# Patient Record
Sex: FEMALE | Race: WHITE | NOT HISPANIC OR LATINO | Employment: UNEMPLOYED | URBAN - METROPOLITAN AREA
[De-identification: names, ages, dates, MRNs, and addresses within clinical notes are randomized per-mention and may not be internally consistent; named-entity substitution may affect disease eponyms.]

---

## 2023-02-01 ENCOUNTER — OFFICE VISIT (OUTPATIENT)
Dept: OBSTETRICS AND GYNECOLOGY | Facility: CLINIC | Age: 53
End: 2023-02-01
Payer: MEDICAID

## 2023-02-01 VITALS
BODY MASS INDEX: 33.49 KG/M2 | SYSTOLIC BLOOD PRESSURE: 140 MMHG | WEIGHT: 189 LBS | DIASTOLIC BLOOD PRESSURE: 81 MMHG | HEIGHT: 63 IN | HEART RATE: 56 BPM

## 2023-02-01 DIAGNOSIS — N95.2 ATROPHIC VAGINITIS: ICD-10-CM

## 2023-02-01 DIAGNOSIS — F31.9 BIPOLAR AFFECTIVE DISORDER, REMISSION STATUS UNSPECIFIED: ICD-10-CM

## 2023-02-01 DIAGNOSIS — N95.9 MENOPAUSAL DISORDER: ICD-10-CM

## 2023-02-01 DIAGNOSIS — Z00.00 PHYSICAL EXAM, ANNUAL: Primary | ICD-10-CM

## 2023-02-01 PROCEDURE — 3008F BODY MASS INDEX DOCD: CPT | Mod: CPTII,S$GLB,, | Performed by: OBSTETRICS & GYNECOLOGY

## 2023-02-01 PROCEDURE — 1159F PR MEDICATION LIST DOCUMENTED IN MEDICAL RECORD: ICD-10-PCS | Mod: CPTII,S$GLB,, | Performed by: OBSTETRICS & GYNECOLOGY

## 2023-02-01 PROCEDURE — 3008F PR BODY MASS INDEX (BMI) DOCUMENTED: ICD-10-PCS | Mod: CPTII,S$GLB,, | Performed by: OBSTETRICS & GYNECOLOGY

## 2023-02-01 PROCEDURE — 3079F PR MOST RECENT DIASTOLIC BLOOD PRESSURE 80-89 MM HG: ICD-10-PCS | Mod: CPTII,S$GLB,, | Performed by: OBSTETRICS & GYNECOLOGY

## 2023-02-01 PROCEDURE — 99386 PR PREVENTIVE VISIT,NEW,40-64: ICD-10-PCS | Mod: S$GLB,,, | Performed by: OBSTETRICS & GYNECOLOGY

## 2023-02-01 PROCEDURE — 3079F DIAST BP 80-89 MM HG: CPT | Mod: CPTII,S$GLB,, | Performed by: OBSTETRICS & GYNECOLOGY

## 2023-02-01 PROCEDURE — 1159F MED LIST DOCD IN RCRD: CPT | Mod: CPTII,S$GLB,, | Performed by: OBSTETRICS & GYNECOLOGY

## 2023-02-01 PROCEDURE — 99386 PREV VISIT NEW AGE 40-64: CPT | Mod: S$GLB,,, | Performed by: OBSTETRICS & GYNECOLOGY

## 2023-02-01 PROCEDURE — 3077F SYST BP >= 140 MM HG: CPT | Mod: CPTII,S$GLB,, | Performed by: OBSTETRICS & GYNECOLOGY

## 2023-02-01 PROCEDURE — 3077F PR MOST RECENT SYSTOLIC BLOOD PRESSURE >= 140 MM HG: ICD-10-PCS | Mod: CPTII,S$GLB,, | Performed by: OBSTETRICS & GYNECOLOGY

## 2023-02-01 RX ORDER — TIZANIDINE 2 MG/1
TABLET ORAL
COMMUNITY
Start: 2023-01-19 | End: 2023-06-12 | Stop reason: SDUPTHER

## 2023-02-01 RX ORDER — GABAPENTIN 800 MG/1
TABLET ORAL
COMMUNITY
Start: 2023-01-31 | End: 2023-05-16 | Stop reason: SDUPTHER

## 2023-02-01 RX ORDER — CARIPRAZINE 6 MG/1
CAPSULE, GELATIN COATED ORAL
COMMUNITY
Start: 2023-01-14 | End: 2023-05-16

## 2023-02-01 RX ORDER — ESTRADIOL 0.1 MG/D
1 PATCH TRANSDERMAL
Qty: 4 PATCH | Refills: 11 | Status: SHIPPED | OUTPATIENT
Start: 2023-02-01 | End: 2023-06-12

## 2023-02-01 RX ORDER — FAMOTIDINE 20 MG/1
20 TABLET, FILM COATED ORAL
COMMUNITY
Start: 2022-12-30 | End: 2023-05-16 | Stop reason: SDUPTHER

## 2023-02-01 RX ORDER — SUCRALFATE 1 G/1
TABLET ORAL
COMMUNITY
Start: 2022-12-19 | End: 2023-09-27 | Stop reason: SDUPTHER

## 2023-02-01 RX ORDER — ZOLPIDEM TARTRATE 10 MG/1
TABLET ORAL
COMMUNITY
Start: 2023-01-13

## 2023-02-01 RX ORDER — LORAZEPAM 1 MG/1
TABLET ORAL
COMMUNITY
Start: 2022-12-31

## 2023-02-01 RX ORDER — OLMESARTAN MEDOXOMIL AND HYDROCHLOROTHIAZIDE 20/12.5 20; 12.5 MG/1; MG/1
TABLET ORAL
COMMUNITY
Start: 2023-01-19 | End: 2023-05-16

## 2023-02-01 RX ORDER — HYDROXYZINE HYDROCHLORIDE 50 MG/1
TABLET, FILM COATED ORAL
COMMUNITY
Start: 2022-12-16 | End: 2023-05-16

## 2023-02-01 RX ORDER — ERGOCALCIFEROL 1.25 MG/1
50000 CAPSULE ORAL
COMMUNITY
Start: 2022-12-19 | End: 2023-05-16

## 2023-02-01 RX ORDER — VORTIOXETINE 20 MG/1
TABLET, FILM COATED ORAL
COMMUNITY
Start: 2022-12-31 | End: 2023-05-16

## 2023-02-01 NOTE — PROGRESS NOTES
Subjective:       Patient ID: Ana María Qureshi is a 52 y.o. female.    Chief Complaint: Well Woman and menopausal symptoms    52-year-old female here for annual exam she is currently be in evaluated by Psychiatry in changing her bipolar medication she also complains of decreased libido recently sexually active in dry vagina some hot flashes.  She is had a hysterectomy still has her ovaries.  I discussed hormones with her will put her on little patch if her libido does increase we can add some testosterone    Review of Systems   Constitutional:  Negative for activity change, appetite change, chills, fever and unexpected weight change.   HENT:  Negative for nasal congestion, dental problem, facial swelling, hearing loss and mouth sores.    Respiratory:  Negative for apnea, chest tightness, shortness of breath and wheezing.    Cardiovascular:  Negative for chest pain and leg swelling.   Gastrointestinal:  Negative for abdominal distention, abdominal pain, anal bleeding, blood in stool, constipation, diarrhea, nausea, rectal pain and vomiting.   Genitourinary:  Positive for dyspareunia. Negative for decreased urine volume, difficulty urinating, dysuria, enuresis, flank pain, frequency, genital sores, hematuria, menstrual problem, pelvic pain, urgency, vaginal bleeding, vaginal discharge and vaginal pain.   Musculoskeletal:  Negative for arthralgias, back pain, joint swelling, myalgias and neck pain.   Integumentary:  Negative for color change, pallor, rash and wound.   Allergic/Immunologic: Negative for immunocompromised state.   Neurological:  Negative for dizziness, light-headedness and headaches.   Hematological:  Negative for adenopathy. Does not bruise/bleed easily.   Psychiatric/Behavioral:  Negative for agitation, behavioral problems, confusion, sleep disturbance and suicidal ideas. The patient is not nervous/anxious and is not hyperactive.        Objective:      Physical Exam  Constitutional:       Appearance: She  is well-developed.   HENT:      Head: Normocephalic.      Nose: Nose normal.   Eyes:      Conjunctiva/sclera: Conjunctivae normal.      Pupils: Pupils are equal, round, and reactive to light.   Cardiovascular:      Rate and Rhythm: Normal rate and regular rhythm.      Heart sounds: Normal heart sounds.   Pulmonary:      Effort: Pulmonary effort is normal.      Breath sounds: Normal breath sounds.   Abdominal:      General: Bowel sounds are normal.      Palpations: Abdomen is soft.   Genitourinary:     Vagina: Normal.      Comments: Vulva vagina bimanual normal little bit atrophic vaginitis  Musculoskeletal:         General: Normal range of motion.      Cervical back: Normal range of motion and neck supple.   Skin:     General: Skin is warm and dry.   Neurological:      Mental Status: She is alert and oriented to person, place, and time.   Psychiatric:         Behavior: Behavior normal.         Thought Content: Thought content normal.     Breast implants without masses  Assessment:       Problem List Items Addressed This Visit    None      Plan:     Climara 0.1 mg

## 2023-02-07 LAB — Lab: NORMAL

## 2023-05-16 ENCOUNTER — OFFICE VISIT (OUTPATIENT)
Dept: FAMILY MEDICINE | Facility: CLINIC | Age: 53
End: 2023-05-16
Payer: MEDICAID

## 2023-05-16 VITALS
DIASTOLIC BLOOD PRESSURE: 98 MMHG | SYSTOLIC BLOOD PRESSURE: 180 MMHG | BODY MASS INDEX: 31.05 KG/M2 | HEART RATE: 76 BPM | TEMPERATURE: 98 F | WEIGHT: 175.25 LBS | HEIGHT: 63 IN | OXYGEN SATURATION: 96 %

## 2023-05-16 DIAGNOSIS — M77.32 HEEL SPUR, LEFT: ICD-10-CM

## 2023-05-16 DIAGNOSIS — I10 PRIMARY HYPERTENSION: ICD-10-CM

## 2023-05-16 DIAGNOSIS — F33.9 EPISODE OF RECURRENT MAJOR DEPRESSIVE DISORDER, UNSPECIFIED DEPRESSION EPISODE SEVERITY: ICD-10-CM

## 2023-05-16 DIAGNOSIS — E55.9 VITAMIN D DEFICIENCY: ICD-10-CM

## 2023-05-16 DIAGNOSIS — D50.9 IRON DEFICIENCY ANEMIA, UNSPECIFIED IRON DEFICIENCY ANEMIA TYPE: ICD-10-CM

## 2023-05-16 DIAGNOSIS — M79.7 FIBROMYALGIA: ICD-10-CM

## 2023-05-16 DIAGNOSIS — Z72.0 NICOTINE USE: ICD-10-CM

## 2023-05-16 DIAGNOSIS — G62.9 POLYNEUROPATHY: ICD-10-CM

## 2023-05-16 DIAGNOSIS — K25.7 CHRONIC GASTRIC ULCER WITHOUT HEMORRHAGE AND WITHOUT PERFORATION: ICD-10-CM

## 2023-05-16 DIAGNOSIS — M19.90 OSTEOARTHRITIS, UNSPECIFIED OSTEOARTHRITIS TYPE, UNSPECIFIED SITE: ICD-10-CM

## 2023-05-16 DIAGNOSIS — Z76.89 ENCOUNTER TO ESTABLISH CARE: Primary | ICD-10-CM

## 2023-05-16 DIAGNOSIS — Z12.31 SCREENING MAMMOGRAM FOR BREAST CANCER: ICD-10-CM

## 2023-05-16 DIAGNOSIS — F41.1 GENERALIZED ANXIETY DISORDER: ICD-10-CM

## 2023-05-16 DIAGNOSIS — G47.00 INSOMNIA, UNSPECIFIED TYPE: ICD-10-CM

## 2023-05-16 LAB
ALBUMIN SERPL-MCNC: 4.1 G/DL (ref 3.4–5)
ALBUMIN/GLOB SERPL: 1.6 RATIO
ALP SERPL-CCNC: 68 UNIT/L (ref 50–144)
ALT SERPL-CCNC: 19 UNIT/L (ref 1–45)
ANION GAP SERPL CALC-SCNC: 6 MEQ/L (ref 2–13)
AST SERPL-CCNC: 31 UNIT/L (ref 14–36)
BASOPHILS # BLD AUTO: 0.05 X10(3)/MCL (ref 0.01–0.08)
BASOPHILS NFR BLD AUTO: 0.6 % (ref 0.1–1.2)
BILIRUBIN DIRECT+TOT PNL SERPL-MCNC: 0.3 MG/DL (ref 0–1)
BUN SERPL-MCNC: 16 MG/DL (ref 7–20)
CALCIUM SERPL-MCNC: 9.1 MG/DL (ref 8.4–10.2)
CHLORIDE SERPL-SCNC: 106 MMOL/L (ref 98–110)
CHOLEST SERPL-MCNC: 174 MG/DL (ref 0–200)
CO2 SERPL-SCNC: 26 MMOL/L (ref 21–32)
CREAT SERPL-MCNC: 0.69 MG/DL (ref 0.66–1.25)
CREAT/UREA NIT SERPL: 23 (ref 12–20)
DEPRECATED CALCIDIOL+CALCIFEROL SERPL-MC: <20 NG/ML (ref 30–100)
EOSINOPHIL # BLD AUTO: 0.09 X10(3)/MCL (ref 0.04–0.36)
EOSINOPHIL NFR BLD AUTO: 1 % (ref 0.7–7)
ERYTHROCYTE [DISTWIDTH] IN BLOOD BY AUTOMATED COUNT: 15 % (ref 11–14.5)
EST. AVERAGE GLUCOSE BLD GHB EST-MCNC: 111.2 MG/DL (ref 70–115)
FERRITIN SERPL-MCNC: 8.54 NG/ML (ref 6.24–264)
GFR SERPLBLD CREATININE-BSD FMLA CKD-EPI: >90 MLS/MIN/1.73/M2
GLOBULIN SER-MCNC: 2.6 GM/DL (ref 2–3.9)
GLUCOSE SERPL-MCNC: 92 MG/DL (ref 70–115)
HBA1C MFR BLD: 5.5 % (ref 4–6)
HCT VFR BLD AUTO: 41.4 % (ref 36–48)
HDLC SERPL-MCNC: 62 MG/DL (ref 40–60)
HGB BLD-MCNC: 13.6 G/DL (ref 11.8–16)
IMM GRANULOCYTES # BLD AUTO: 0.02 X10(3)/MCL (ref 0–0.03)
IMM GRANULOCYTES NFR BLD AUTO: 0.2 % (ref 0–0.5)
LDLC SERPL DIRECT ASSAY-SCNC: 82.8 MG/DL (ref 30–100)
LYMPHOCYTES # BLD AUTO: 2.01 X10(3)/MCL (ref 1.16–3.74)
LYMPHOCYTES NFR BLD AUTO: 23.3 % (ref 20–55)
MCH RBC QN AUTO: 29.1 PG (ref 27–34)
MCHC RBC AUTO-ENTMCNC: 32.9 G/DL (ref 31–37)
MCV RBC AUTO: 88.7 FL (ref 79–99)
MONOCYTES # BLD AUTO: 0.55 X10(3)/MCL (ref 0.24–0.36)
MONOCYTES NFR BLD AUTO: 6.4 % (ref 4.7–12.5)
NEUTROPHILS # BLD AUTO: 5.91 X10(3)/MCL (ref 1.56–6.13)
NEUTROPHILS NFR BLD AUTO: 68.5 % (ref 37–73)
NRBC BLD AUTO-RTO: 0 %
PLATELET # BLD AUTO: 247 X10(3)/MCL (ref 140–371)
PMV BLD AUTO: 12 FL (ref 9.4–12.4)
POTASSIUM SERPL-SCNC: 4.7 MMOL/L (ref 3.5–5.1)
PROT SERPL-MCNC: 6.7 GM/DL (ref 6.3–8.2)
RBC # BLD AUTO: 4.67 X10(6)/MCL (ref 4–5.1)
SODIUM SERPL-SCNC: 138 MMOL/L (ref 135–145)
TRIGL SERPL-MCNC: 85 MG/DL (ref 30–200)
TSH SERPL-ACNC: 1.57 UIU/ML (ref 0.36–3.74)
WBC # SPEC AUTO: 8.63 X10(3)/MCL (ref 4–11.5)

## 2023-05-16 PROCEDURE — 3077F SYST BP >= 140 MM HG: CPT | Mod: CPTII,,, | Performed by: NURSE PRACTITIONER

## 2023-05-16 PROCEDURE — 84443 ASSAY THYROID STIM HORMONE: CPT | Performed by: NURSE PRACTITIONER

## 2023-05-16 PROCEDURE — 4010F PR ACE/ARB THEARPY RXD/TAKEN: ICD-10-PCS | Mod: CPTII,,, | Performed by: NURSE PRACTITIONER

## 2023-05-16 PROCEDURE — 3008F BODY MASS INDEX DOCD: CPT | Mod: CPTII,,, | Performed by: NURSE PRACTITIONER

## 2023-05-16 PROCEDURE — 3080F PR MOST RECENT DIASTOLIC BLOOD PRESSURE >= 90 MM HG: ICD-10-PCS | Mod: CPTII,,, | Performed by: NURSE PRACTITIONER

## 2023-05-16 PROCEDURE — 4010F ACE/ARB THERAPY RXD/TAKEN: CPT | Mod: CPTII,,, | Performed by: NURSE PRACTITIONER

## 2023-05-16 PROCEDURE — 3077F PR MOST RECENT SYSTOLIC BLOOD PRESSURE >= 140 MM HG: ICD-10-PCS | Mod: CPTII,,, | Performed by: NURSE PRACTITIONER

## 2023-05-16 PROCEDURE — 1159F MED LIST DOCD IN RCRD: CPT | Mod: CPTII,,, | Performed by: NURSE PRACTITIONER

## 2023-05-16 PROCEDURE — 1160F PR REVIEW ALL MEDS BY PRESCRIBER/CLIN PHARMACIST DOCUMENTED: ICD-10-PCS | Mod: CPTII,,, | Performed by: NURSE PRACTITIONER

## 2023-05-16 PROCEDURE — 1159F PR MEDICATION LIST DOCUMENTED IN MEDICAL RECORD: ICD-10-PCS | Mod: CPTII,,, | Performed by: NURSE PRACTITIONER

## 2023-05-16 PROCEDURE — 85025 COMPLETE CBC W/AUTO DIFF WBC: CPT | Performed by: NURSE PRACTITIONER

## 2023-05-16 PROCEDURE — 83550 IRON BINDING TEST: CPT | Performed by: NURSE PRACTITIONER

## 2023-05-16 PROCEDURE — 3008F PR BODY MASS INDEX (BMI) DOCUMENTED: ICD-10-PCS | Mod: CPTII,,, | Performed by: NURSE PRACTITIONER

## 2023-05-16 PROCEDURE — 83036 HEMOGLOBIN GLYCOSYLATED A1C: CPT | Performed by: NURSE PRACTITIONER

## 2023-05-16 PROCEDURE — 3080F DIAST BP >= 90 MM HG: CPT | Mod: CPTII,,, | Performed by: NURSE PRACTITIONER

## 2023-05-16 PROCEDURE — 80053 COMPREHEN METABOLIC PANEL: CPT | Performed by: NURSE PRACTITIONER

## 2023-05-16 PROCEDURE — 80061 LIPID PANEL: CPT | Performed by: NURSE PRACTITIONER

## 2023-05-16 PROCEDURE — 99204 OFFICE O/P NEW MOD 45 MIN: CPT | Mod: ,,, | Performed by: NURSE PRACTITIONER

## 2023-05-16 PROCEDURE — 82306 VITAMIN D 25 HYDROXY: CPT | Performed by: NURSE PRACTITIONER

## 2023-05-16 PROCEDURE — 82728 ASSAY OF FERRITIN: CPT | Performed by: NURSE PRACTITIONER

## 2023-05-16 PROCEDURE — 1160F RVW MEDS BY RX/DR IN RCRD: CPT | Mod: CPTII,,, | Performed by: NURSE PRACTITIONER

## 2023-05-16 PROCEDURE — 99204 PR OFFICE/OUTPT VISIT, NEW, LEVL IV, 45-59 MIN: ICD-10-PCS | Mod: ,,, | Performed by: NURSE PRACTITIONER

## 2023-05-16 RX ORDER — OLMESARTAN MEDOXOMIL 40 MG/1
TABLET ORAL
Qty: 30 TABLET | Refills: 0 | Status: SHIPPED | OUTPATIENT
Start: 2023-05-16 | End: 2023-06-20 | Stop reason: SDUPTHER

## 2023-05-16 RX ORDER — OLMESARTAN MEDOXOMIL 40 MG/1
40 TABLET ORAL DAILY
Qty: 30 TABLET | Refills: 0 | Status: SHIPPED | OUTPATIENT
Start: 2023-05-16 | End: 2023-05-16

## 2023-05-16 RX ORDER — FAMOTIDINE 20 MG/1
20 TABLET, FILM COATED ORAL 2 TIMES DAILY
Qty: 60 TABLET | Refills: 0 | Status: SHIPPED | OUTPATIENT
Start: 2023-05-16 | End: 2023-06-20 | Stop reason: SDUPTHER

## 2023-05-16 RX ORDER — NAPROXEN 500 MG/1
500 TABLET ORAL 2 TIMES DAILY
COMMUNITY
Start: 2023-04-13 | End: 2023-06-20

## 2023-05-16 RX ORDER — GABAPENTIN 800 MG/1
800 TABLET ORAL 3 TIMES DAILY
Qty: 90 TABLET | Refills: 0 | Status: SHIPPED | OUTPATIENT
Start: 2023-05-16 | End: 2023-06-13 | Stop reason: SDUPTHER

## 2023-05-16 RX ORDER — LISINOPRIL 30 MG/1
30 TABLET ORAL DAILY
COMMUNITY
Start: 2023-05-10 | End: 2023-05-16

## 2023-05-16 NOTE — PROGRESS NOTES
Patient ID: Ana María Qureshi is a 52 y.o. female.    Chief Complaint: Establish Care, Hypertension, and Anemia                     History of Present Illness:  The patient is 52 y.o. White female for evaluation and management with a chief complaint of Establish Care, Hypertension, and Anemia .  Previous PCP at had [facility] clinic but has not been seen in the past 4 months.  She has been visiting urgent care in [place] for medication refills.  She was diagnosed with hypertension 20-30 years ago but blood pressures have been very elevated over the past year.  She reports readings of 180/100 on lisinopril 30 mg daily.    She also reports questionable history of bipolar disorder.  Diagnosed with both anxiety and depression several years ago while experiencing a divorce.  She experienced drastic mood swings, constant worry, and agitation.  At this time, she is off of all of her psychiatric medications except Ambien and lorazepam.  He no longer feels that she requires the Trintellix or Vraylar.  She continues to complain of feeling stressed and anxious but is resting well with the Ambien.  No SI/HI.  Established with Dr. Frost in [place] for Psychiatry.      Requesting refill on gabapentin.  She complains of generalized pain secondary to fibromyalgia and burning/tingling pain to bilateral lower legs and feet secondary to neuropathy.  She attributes much of this pain to a fall experienced 7-8 years ago resulting in disc herniation of cervical and lumbar regions.    History of peptic ulcer disease with most recent EGD within the past 1-2 years by Dr. Mckeon in [place].  Ulcerations near site of gastric bypass.   Requesting refill on famotidine.  Uses sulcralfate as needed.  GERD was best controlled on Dexilant in the past but was unable to afford. History of colon polyps that were benign.  She also has a history of iron-deficiency anemia that required 3 units of packed red blood cells within the past few years.  She  reports that they were unable to determine site of blood loss.     History of alcohol abuse but has been sober for the past 5 years.  Currently smokes 1 pack of cigarettes per day.  Intermittent smoker for the past 40 years.      Review of Systems   Constitutional:  Negative for activity change, appetite change, diaphoresis and fever.   HENT:  Negative for ear pain, hearing loss and trouble swallowing.    Eyes:  Negative for pain and visual disturbance.   Respiratory:  Negative for cough, chest tightness and shortness of breath.    Cardiovascular:  Negative for chest pain, palpitations, leg swelling and claudication.   Gastrointestinal:  Negative for abdominal pain, blood in stool, change in bowel habit, constipation, diarrhea, nausea, vomiting and change in bowel habit.   Endocrine: Negative for cold intolerance, heat intolerance, polydipsia, polyphagia and polyuria.   Genitourinary:  Negative for dysuria, frequency and hematuria.   Musculoskeletal:  Positive for arthralgias, back pain, leg pain and neck pain. Negative for gait problem and joint swelling.   Integumentary:  Negative for rash and mole/lesion.   Allergic/Immunologic: Negative for environmental allergies and food allergies.   Neurological:  Negative for dizziness, seizures, weakness, headaches and memory loss.   Psychiatric/Behavioral:  Positive for agitation and sleep disturbance. Negative for confusion, hallucinations and suicidal ideas. The patient is nervous/anxious.        Past Medical History:  has a past medical history of Anemia, Hypertension, and Mental disorder.    Current Outpatient Medications   Medication Instructions    estradiol 0.1 mg/24 hr td ptwk (ESTRADIOL TRANSDERMAL PATCH) 0.1 mg/24 hr PTWK 1 patch, Transdermal, Every 7 days    famotidine (PEPCID) 20 mg, Oral    gabapentin (NEURONTIN) 800 mg, Oral, 3 times daily    LORazepam (ATIVAN) 1 MG tablet No dose, route, or frequency recorded.    naproxen (NAPROSYN) 500 mg, Oral, 2 times  "daily    olmesartan (BENICAR) 40 MG tablet TAKE 1 TABLET BY MOUTH ONCE DAILY    sucralfate (CARAFATE) 1 gram tablet TAKE 1 TABLET BY MOUTH FOUR TIMES A DAY ONE HOUR BEFORE MEALS    tiZANidine (ZANAFLEX) 2 MG tablet No dose, route, or frequency recorded.    zolpidem (AMBIEN) 10 mg Tab No dose, route, or frequency recorded.       Patient has No Known Allergies.       Visit Vitals  BP (!) 180/98   Pulse 76   Temp 98.2 °F (36.8 °C) (Temporal)   Ht 5' 3" (1.6 m)   Wt 79.5 kg (175 lb 4.3 oz)   SpO2 96%   BMI 31.05 kg/m²         Physical Exam  Constitutional:       General: She is not in acute distress.     Appearance: Normal appearance. She is obese.   HENT:      Right Ear: Tympanic membrane, ear canal and external ear normal.      Left Ear: Tympanic membrane, ear canal and external ear normal.      Mouth/Throat:      Mouth: Mucous membranes are moist.      Pharynx: Oropharynx is clear.   Eyes:      General: No scleral icterus.     Extraocular Movements: Extraocular movements intact.      Conjunctiva/sclera: Conjunctivae normal.      Pupils: Pupils are equal, round, and reactive to light.   Cardiovascular:      Rate and Rhythm: Normal rate and regular rhythm.      Pulses: Normal pulses.      Heart sounds: Normal heart sounds.   Pulmonary:      Effort: Pulmonary effort is normal.      Breath sounds: Normal breath sounds.   Abdominal:      General: Abdomen is flat. Bowel sounds are normal.      Palpations: Abdomen is soft.      Tenderness: There is abdominal tenderness (Generalized).   Musculoskeletal:         General: Normal range of motion.      Cervical back: Normal range of motion and neck supple. No tenderness.      Right lower leg: No edema.      Left lower leg: No edema.   Lymphadenopathy:      Cervical: No cervical adenopathy.   Skin:     General: Skin is warm and dry.   Neurological:      Mental Status: She is alert and oriented to person, place, and time. Mental status is at baseline.   Psychiatric:         Mood " and Affect: Mood normal.         Thought Content: Thought content normal.         Judgment: Judgment normal.           Assessment/Plan:    ICD-10-CM ICD-9-CM   1. Encounter to establish care  Z76.89 V65.8   2. Primary hypertension  I10 401.9   3. Chronic gastric ulcer without hemorrhage and without perforation  K25.7 531.70   4. Iron deficiency anemia, unspecified iron deficiency anemia type  D50.9 280.9   5. Polyneuropathy  G62.9 356.9   6. Osteoarthritis, unspecified osteoarthritis type, unspecified site  M19.90 715.90   7. Screening mammogram for breast cancer  Z12.31 V76.12   8. Vitamin D deficiency  E55.9 268.9   9. Fibromyalgia  M79.7 729.1   10. Episode of recurrent major depressive disorder, unspecified depression episode severity  F33.9 296.30   11. Generalized anxiety disorder  F41.1 300.02   12. Insomnia, unspecified type  G47.00 780.52   13. Nicotine use  Z72.0 305.1   14. Heel spur, left  M77.32 726.73       1. Encounter to establish care  Obtain baseline labs today including CBC, CMP, TSH, FLP, A1c    2. Primary hypertension  Elevated in office   Stop lisinopril and restart olmesartan 40 mg daily   Instructed to monitor blood pressure daily, record, and bring log to next visit   Low-sodium diet    3. Chronic gastric ulcer without hemorrhage and without perforation  Continue famotidine 20 mg b.i.d.  Obtain records from most recent EGD    4. Iron deficiency anemia, unspecified iron deficiency anemia type  CBC with iron studies today    5. Polyneuropathy  - gabapentin (NEURONTIN) 800 MG tablet; Take 1 tablet (800 mg total) by mouth 3 (three) times daily.  Dispense: 90 tablet; Refill: 0    6. Osteoarthritis, unspecified osteoarthritis type, unspecified site  Continue naproxen as needed     7. Screening mammogram for breast cancer  Mammo Digital Screening Bilat w/ Erick; Future    8. Vitamin D deficiency  Obtain vitamin-D level today    9. Fibromyalgia  Continue gabapentin 800 mg t.i.d.  Discussed duloxetine  but patient reports that it was ineffective in the past    10. Episode of recurrent major depressive disorder, unspecified depression episode severity  Off of all medications   Encouraged to discuss current issues with Psychiatry  Offered appointment with psychiatric nurse practitioner in office but patient declines at this time    11. Generalized anxiety disorder  Practice deep breathing when anxiety occurs.  Exercise daily. Get sunlight daily.  Avoid caffeine, alcohol and stimulants.  Practice positive phrases and repeat throughout the day, along with yoga and relaxation techniques.  Set healthy boundaries, avoid people and conversations that increase stress.  Reports any suicidal or homicidal ideations immediately. If clinic is closed, go to nearest emergency room.     12. Insomnia, unspecified type  Continue follow-up with Psychiatry   Continue Ambien 10 mg as needed    13. Nicotine use  Encourage complete cessation    14. Heel spur, left       Return to clinic in 2 weeks with blood pressure log or sooner if needed    Future Appointments   Date Time Provider Department Center   6/12/2023  8:00 AM SANDOR Roblero [facility]   2/6/2024 10:30 AM Rafael Freitas MD [id number]  SANDOR Mckenzie

## 2023-05-17 LAB
IRON SATN MFR SERPL: 27 % (ref 20–50)
IRON SERPL-MCNC: 84 UG/DL (ref 50–170)
TIBC SERPL-MCNC: 231 UG/DL (ref 70–310)
TIBC SERPL-MCNC: 315 UG/DL (ref 250–450)
TRANSFERRIN SERPL-MCNC: 285 MG/DL (ref 180–382)

## 2023-06-08 ENCOUNTER — TELEPHONE (OUTPATIENT)
Dept: FAMILY MEDICINE | Facility: CLINIC | Age: 53
End: 2023-06-08
Payer: MEDICAID

## 2023-06-08 NOTE — TELEPHONE ENCOUNTER
Pt states she needs nasal spray but does not know what it is.  Advised her to bring meds to all visits so we can document everything, pt verbalized understanding

## 2023-06-08 NOTE — TELEPHONE ENCOUNTER
----- Message from Sho Newby sent at 6/8/2023  3:23 PM CDT -----  Regarding: CALL BACK  Pt called said last time she was seen in the office on 5/16/23, she had currently been on a nasal spray, and she was told that she would get refills on it, she said she got refills on all her meds except for that one?    Pat       [phone]

## 2023-06-12 ENCOUNTER — OFFICE VISIT (OUTPATIENT)
Dept: FAMILY MEDICINE | Facility: CLINIC | Age: 53
End: 2023-06-12
Payer: MEDICAID

## 2023-06-12 VITALS
DIASTOLIC BLOOD PRESSURE: 90 MMHG | OXYGEN SATURATION: 95 % | HEART RATE: 74 BPM | BODY MASS INDEX: 30.28 KG/M2 | SYSTOLIC BLOOD PRESSURE: 144 MMHG | HEIGHT: 63 IN | TEMPERATURE: 98 F | WEIGHT: 170.88 LBS

## 2023-06-12 DIAGNOSIS — Z72.0 NICOTINE USE: ICD-10-CM

## 2023-06-12 DIAGNOSIS — Z12.2 SCREENING FOR LUNG CANCER: ICD-10-CM

## 2023-06-12 DIAGNOSIS — E55.9 VITAMIN D DEFICIENCY: ICD-10-CM

## 2023-06-12 DIAGNOSIS — I10 PRIMARY HYPERTENSION: Primary | ICD-10-CM

## 2023-06-12 DIAGNOSIS — N90.89 LESION OF LABIA: ICD-10-CM

## 2023-06-12 DIAGNOSIS — Z12.31 SCREENING MAMMOGRAM FOR BREAST CANCER: ICD-10-CM

## 2023-06-12 DIAGNOSIS — S39.011A STRAIN OF MUSCLE OF RIGHT GROIN REGION: ICD-10-CM

## 2023-06-12 PROCEDURE — 1160F PR REVIEW ALL MEDS BY PRESCRIBER/CLIN PHARMACIST DOCUMENTED: ICD-10-PCS | Mod: CPTII,,, | Performed by: NURSE PRACTITIONER

## 2023-06-12 PROCEDURE — 3008F PR BODY MASS INDEX (BMI) DOCUMENTED: ICD-10-PCS | Mod: CPTII,,, | Performed by: NURSE PRACTITIONER

## 2023-06-12 PROCEDURE — 1159F MED LIST DOCD IN RCRD: CPT | Mod: CPTII,,, | Performed by: NURSE PRACTITIONER

## 2023-06-12 PROCEDURE — 3077F PR MOST RECENT SYSTOLIC BLOOD PRESSURE >= 140 MM HG: ICD-10-PCS | Mod: CPTII,,, | Performed by: NURSE PRACTITIONER

## 2023-06-12 PROCEDURE — 3080F PR MOST RECENT DIASTOLIC BLOOD PRESSURE >= 90 MM HG: ICD-10-PCS | Mod: CPTII,,, | Performed by: NURSE PRACTITIONER

## 2023-06-12 PROCEDURE — 99214 OFFICE O/P EST MOD 30 MIN: CPT | Mod: ,,, | Performed by: NURSE PRACTITIONER

## 2023-06-12 PROCEDURE — 1160F RVW MEDS BY RX/DR IN RCRD: CPT | Mod: CPTII,,, | Performed by: NURSE PRACTITIONER

## 2023-06-12 PROCEDURE — 1159F PR MEDICATION LIST DOCUMENTED IN MEDICAL RECORD: ICD-10-PCS | Mod: CPTII,,, | Performed by: NURSE PRACTITIONER

## 2023-06-12 PROCEDURE — 3080F DIAST BP >= 90 MM HG: CPT | Mod: CPTII,,, | Performed by: NURSE PRACTITIONER

## 2023-06-12 PROCEDURE — 4010F PR ACE/ARB THEARPY RXD/TAKEN: ICD-10-PCS | Mod: CPTII,,, | Performed by: NURSE PRACTITIONER

## 2023-06-12 PROCEDURE — 4010F ACE/ARB THERAPY RXD/TAKEN: CPT | Mod: CPTII,,, | Performed by: NURSE PRACTITIONER

## 2023-06-12 PROCEDURE — 3077F SYST BP >= 140 MM HG: CPT | Mod: CPTII,,, | Performed by: NURSE PRACTITIONER

## 2023-06-12 PROCEDURE — 99214 PR OFFICE/OUTPT VISIT, EST, LEVL IV, 30-39 MIN: ICD-10-PCS | Mod: ,,, | Performed by: NURSE PRACTITIONER

## 2023-06-12 PROCEDURE — 3008F BODY MASS INDEX DOCD: CPT | Mod: CPTII,,, | Performed by: NURSE PRACTITIONER

## 2023-06-12 RX ORDER — CHLORTHALIDONE 25 MG/1
25 TABLET ORAL DAILY
Qty: 30 TABLET | Refills: 0 | Status: SHIPPED | OUTPATIENT
Start: 2023-06-12 | End: 2023-06-27 | Stop reason: SDUPTHER

## 2023-06-12 RX ORDER — TIZANIDINE 4 MG/1
4 TABLET ORAL EVERY 8 HOURS PRN
Qty: 30 TABLET | Refills: 0 | Status: SHIPPED | OUTPATIENT
Start: 2023-06-12 | End: 2023-06-20 | Stop reason: SDUPTHER

## 2023-06-12 RX ORDER — DICLOFENAC SODIUM 10 MG/G
2 GEL TOPICAL 4 TIMES DAILY PRN
Qty: 50 G | Refills: 0 | Status: SHIPPED | OUTPATIENT
Start: 2023-06-12 | End: 2023-06-20

## 2023-06-12 NOTE — PROGRESS NOTES
Patient ID: Ana María Qureshi is a 52 y.o. female.    Chief Complaint: Hypertension (Follow up)                     History of Present Illness:  The patient is 52 y.o. White female for evaluation and management with a chief complaint of Hypertension (Follow up) .  Home blood pressure readings remain elevated 150 to 170/100.  She complains of occasional headache and fatigue.    Complains of right upper, inner thigh pain.  Onset 1-2 weeks ago.  Pain is worse with position changes or if standing for a prolonged duration.  Some improvement with diclofenac but causes epigastric discomfort.  She has a history of gastric ulcers with bleeding and gastric bypass in 2003.    She complains of a small bump near vagina.  First noticed about 3-4 weeks ago after shaving of the area.  Slightly tender to the touch.  Inserted a needle with small amount of bloody drainage.  Recently established care with gyn in [place] but lacks transportation for visit at this time.  Would like local gyn.    She smokes 1 pack of cigarettes per day.  Daily smoker for 30 years.    ROS: See HPI    Past Medical History:  has a past medical history of Anemia, Hypertension, and Mental disorder.    Current Outpatient Medications   Medication Instructions    chlorthalidone (HYGROTEN) 25 mg, Oral, Daily    diclofenac sodium (VOLTAREN) 2 g, Topical (Top), 4 times daily PRN    famotidine (PEPCID) 20 mg, Oral, 2 times daily    gabapentin (NEURONTIN) 800 mg, Oral, 3 times daily    LORazepam (ATIVAN) 1 MG tablet No dose, route, or frequency recorded.    naproxen (NAPROSYN) 500 mg, Oral, 2 times daily    olmesartan (BENICAR) 40 MG tablet TAKE 1 TABLET BY MOUTH ONCE DAILY    sucralfate (CARAFATE) 1 gram tablet TAKE 1 TABLET BY MOUTH FOUR TIMES A DAY ONE HOUR BEFORE MEALS    tiZANidine (ZANAFLEX) 4 mg, Oral, Every 8 hours PRN    zolpidem (AMBIEN) 10 mg Tab No dose, route, or frequency recorded.       Patient has No Known Allergies.       Visit Vitals  BP (!) 144/90  "  Pulse 74   Temp 97.7 °F (36.5 °C) (Oral)   Ht 5' 3" (1.6 m)   Wt 77.5 kg (170 lb 13.7 oz)   SpO2 95%   BMI 30.27 kg/m²         Physical Exam  Vitals reviewed.   Constitutional:       Appearance: Normal appearance.   Cardiovascular:      Rate and Rhythm: Normal rate and regular rhythm.      Heart sounds: Normal heart sounds.   Pulmonary:      Effort: Pulmonary effort is normal.      Breath sounds: Normal breath sounds.   Abdominal:      General: Bowel sounds are normal.      Palpations: Abdomen is soft.   Genitourinary:     Comments: 0.4 cm x 0.4 cm pink, mildly tender nodule to left labia  Musculoskeletal:      Cervical back: Normal range of motion and neck supple. No tenderness.      Comments: Tenderness to medial aspect of right upper leg.  Full active range of motion with pain   Lymphadenopathy:      Cervical: No cervical adenopathy.   Skin:     General: Skin is warm and dry.   Neurological:      Mental Status: She is alert.       Recent Labs Obtained:  No visits with results within 7 Day(s) from this visit.   Latest known visit with results is:   Office Visit on 05/16/2023   Component Date Value Ref Range Status    Sodium Level 05/16/2023 138  135 - 145 mmol/L Final    Potassium Level 05/16/2023 4.7  3.5 - 5.1 mmol/L Final    Chloride 05/16/2023 106  98 - 110 mmol/L Final    Carbon Dioxide 05/16/2023 26  21 - 32 mmol/L Final    Glucose Level 05/16/2023 92  70 - 115 mg/dL Final    Blood Urea Nitrogen 05/16/2023 16.0  7.0 - 20.0 mg/dL Final    Creatinine 05/16/2023 0.69  0.66 - 1.25 mg/dL Final    Calcium Level Total 05/16/2023 9.1  8.4 - 10.2 mg/dL Final    Protein Total 05/16/2023 6.7  6.3 - 8.2 gm/dL Final    Albumin Level 05/16/2023 4.1  3.4 - 5.0 g/dL Final    Globulin 05/16/2023 2.6  2.0 - 3.9 gm/dL Final    Albumin/Globulin Ratio 05/16/2023 1.6  ratio Final    Bilirubin Total 05/16/2023 0.3  0.0 - 1.0 mg/dL Final    Alkaline Phosphatase 05/16/2023 68  50 - 144 unit/L Final    Alanine Aminotransferase " 05/16/2023 19  1 - 45 unit/L Final    Aspartate Aminotransferase 05/16/2023 31  14 - 36 unit/L Final    eGFR 05/16/2023 >90  mls/min/1.73/m2 Final                         EGFR INTERPRETATION    Beginning 8/15/22 we are reporting the eGFRcr calculation as recommended by the [employer]. The eGFRcr equation has similar overall performance characteristics to the older equation, but the values may differ by more than 10% particularly at higher values of eGFRcr and younger adult ages.    NKF stages of chronic kidney disease (CKD)  Stage 1: Kidney damage with normal or increased eGFR (>90 mL/min/1.73 m^2)  Stage 2: Mild reduction in GFR (60-89 mL/min/1.73 m^2)  Stage 3a: Moderate reduction in GFR (45-59 mL/min/1.73 m^2)  Stage 3b: Moderate reduction in GFR (30-44 mL/min/1.73 m^2)  Stage 4: Severe reduction in GFR (15-29 mL/min/1.73 m^2)  Stage 5: Kidney failure (GFR <15 mL/min/1.73 m^2)        Anion Gap 05/16/2023 6.0  2.0 - 13.0 mEq/L Final    BUN/Creatinine Ratio 05/16/2023 23 (H)  12 - 20 Final    Cholesterol Total 05/16/2023 174  0 - 200 mg/dL Final    HDL Cholesterol 05/16/2023 62 (H)  40 - 60 mg/dL Final    Triglyceride 05/16/2023 85  30 - 200 mg/dL Final    LDL Cholesterol Direct 05/16/2023 82.8  30.0 - 100.0 mg/dL Final    Hemoglobin A1c 05/16/2023 5.5  4.0 - 6.0 % Final    Estimated Average Glucose 05/16/2023 111.2  70.0 - 115.0 mg/dL Final    Thyroid Stimulating Hormone 05/16/2023 1.570  0.360 - 3.740 uIU/mL Final    Iron Binding Capacity Unsaturated 05/16/2023 231  70 - 310 ug/dL Final    Iron Level 05/16/2023 84  50 - 170 ug/dL Final    Transferrin 05/16/2023 285  180 - 382 mg/dL Final    Iron Binding Capacity Total 05/16/2023 315  250 - 450 ug/dL Final    Iron Saturation 05/16/2023 27  20 - 50 % Final    Ferritin Level 05/16/2023 8.54  6.24 - 264.00 ng/mL Final    Vit D 25 OH 05/16/2023 <20.0 (L)  30.0 - 100.0 ng/mL Final    WBC 05/16/2023 8.63  4.00 - 11.50 x10(3)/mcL Final    RBC 05/16/2023  4.67  4.00 - 5.10 x10(6)/mcL Final    Hgb 05/16/2023 13.6  11.8 - 16.0 g/dL Final    Hct 05/16/2023 41.4  36.0 - 48.0 % Final    MCV 05/16/2023 88.7  79.0 - 99.0 fL Final    MCH 05/16/2023 29.1  27.0 - 34.0 pg Final    MCHC 05/16/2023 32.9  31.0 - 37.0 g/dL Final    RDW 05/16/2023 15.0 (H)  11.0 - 14.5 % Final    Platelet 05/16/2023 247  140 - 371 x10(3)/mcL Final    MPV 05/16/2023 12.0  9.4 - 12.4 fL Final    Neut % 05/16/2023 68.5  37 - 73 % Final    Lymph % 05/16/2023 23.3  20 - 55 % Final    Mono % 05/16/2023 6.4  4.7 - 12.5 % Final    Eos % 05/16/2023 1.0  0.7 - 7 % Final    Basophil % 05/16/2023 0.6  0.1 - 1.2 % Final    Lymph # 05/16/2023 2.01  1.16 - 3.74 x10(3)/mcL Final    Neut # 05/16/2023 5.91  1.56 - 6.13 x10(3)/mcL Final    Mono # 05/16/2023 0.55 (H)  0.24 - 0.36 x10(3)/mcL Final    Eos # 05/16/2023 0.09  0.04 - 0.36 x10(3)/mcL Final    Baso # 05/16/2023 0.05  0.01 - 0.08 x10(3)/mcL Final    IG# 05/16/2023 0.02  0.0001 - 0.031 x10(3)/mcL Final    IG% 05/16/2023 0.2  0 - 0.5 % Final    NRBC% 05/16/2023 0.0  <=1 % Final         Assessment/Plan:    ICD-10-CM ICD-9-CM   1. Primary hypertension  I10 401.9   2. Vitamin D deficiency  E55.9 268.9   3. Strain of muscle of right groin region  S39.011A 848.8   4. Lesion of labia  N90.89 624.8   5. Nicotine use  Z72.0 305.1   6. Screening for lung cancer  Z12.2 V76.0   7. Screening mammogram for breast cancer  Z12.31 V76.12       1. Primary hypertension  Remains elevated today   Continue olmesartan 40 mg daily and add chlorthalidone 25 mg daily   Low-sodium diet  - chlorthalidone (HYGROTEN) 25 MG Tab; Take 1 tablet (25 mg total) by mouth once daily.  Dispense: 30 tablet; Refill: 0    2. Vitamin D deficiency  Vitamin-D less than 20  Restart Vitamin D 50,000 IU weekly    3. Strain of muscle of right groin region  Hot compresses, stretching  Stop oral NSAIDs and begin diclofenac gel as needed   Increase tizanidine to 4 mg PRN TID  - tiZANidine (ZANAFLEX) 4 MG tablet;  Take 1 tablet (4 mg total) by mouth every 8 (eight) hours as needed (pain).  Dispense: 30 tablet; Refill: 0  - diclofenac sodium (VOLTAREN) 1 % Gel; Apply 2 g topically 4 (four) times daily as needed (pain).  Dispense: 50 g; Refill: 0    4. Lesion of labia  Warm compresses  Avoid shaving until resolution  - Ambulatory referral/consult to Obstetrics / Gynecology; Future    5. Nicotine use  Advise complete cessation  - CT Chest Low Dose Diagnostic; Future    6. Screening for lung cancer  - CT Chest Low Dose Diagnostic; Future    7. Screening mammogram for breast cancer  - Mammo Digital Screening Bilat w/ Erick; Future         Follow up in about 1 week (around 6/19/2023) for BP . or sooner as needed.    Future Appointments   Date Time Provider Department Center   2/6/2024 10:30 AM Rafael Freitas MD [id number] Mckenzie

## 2023-06-13 DIAGNOSIS — G62.9 POLYNEUROPATHY: ICD-10-CM

## 2023-06-13 RX ORDER — GABAPENTIN 800 MG/1
800 TABLET ORAL 3 TIMES DAILY
Qty: 90 TABLET | Refills: 0 | Status: SHIPPED | OUTPATIENT
Start: 2023-06-13 | End: 2023-06-27 | Stop reason: SDUPTHER

## 2023-06-13 NOTE — TELEPHONE ENCOUNTER
"Pt telephoned regarding gabapentin refill. She claimed it was not filled on yesterday during her appt but I did ask her if she requested the refill. She stated "Well I gave the nurse all of my bottles and she saw that it was empty." I did inform her geovanny thought medication bottles may present to clinic empty, the patient is responsible to request a refill of needed meds. She verbalized understanding.      She would like the med sent to the pharmacy listed. I went ahead and proposed it to you just in case you would like to take care of it. She was informed you are out of office until Thursday.  "

## 2023-06-14 ENCOUNTER — PATIENT OUTREACH (OUTPATIENT)
Dept: ADMINISTRATIVE | Facility: HOSPITAL | Age: 53
End: 2023-06-14
Payer: MEDICAID

## 2023-06-14 NOTE — PROGRESS NOTES
Population Health Chart Review & Patient Outreach Details:     Reason for Outreach Encounter:     [x]  Non-Compliant Report   []  Payor Report ([employer], PHN, BCBS, MSSP, MCIP, C, etc.)   []  Pre-Visit Chart Review     Updates Requested / Reviewed:     []  Care Everywhere    []     [x]  External Sources (Requested external records)   [x]  Care Team Updated    Patient Outreach Method:    []  Telephone Outreach Completed   [] Successful   [] Left Voicemail   [] Unable to Contact (wrong number, no voicemail)  []  [facility] Outreach Sent  []  Letter Outreach Mailed  [x]  Fax Sent for External Records  []  External Records Upload    Health Maintenance Topics Addressed and Outreach Outcomes / Actions Taken:        [x]      Breast Cancer Screening [x]  Mammogram Ordered 6/12/23     []  External Records Requested     []  Added Reminder to Complete to Upcoming Primary Care Appt Notes     []  Patient Declined     []  Patient Will Call Back to Schedule     []  Patient Will Schedule with External Provider / Order Routed if Applicable             []       Cervical Cancer Screening []  Pap Scheduled      []  External Records Requested     []  Added Reminder to Complete to Upcoming Primary Care Appt Notes     []  Patient Declined     []  Patient Will Call Back to Schedule                     [x]          Colorectal Cancer Screening []  Colonoscopy Case Request or Referral Placed     [x]  External Records Requested     []  Added Reminder to Complete to Upcoming Primary Care Appt Notes     []  Patient Declined     []  Patient Will Call Back to Schedule     []  Patient Will Schedule with External Provider     []  Fit Kit Mailed (add the SmartPhrase under additional notes)     []  Reminded Patient to Complete Home Test             []      Diabetic Eye Exam []  Eye Camera Scheduled or Optometry Referral Placed     []  External Records Requested     []  Added Reminder to Complete to Upcoming Primary Care Appt Notes  Returned patient's call.     Informed patient of Dr. Shore's note. Patient verbalized understanding.       []  Patient Declined     []  Patient Will Call Back to Schedule     []  Patient Will Schedule with External Provider             []      Blood Pressure Control []  Primary Care Follow Up Visit Scheduled     []  Remote Blood Pressure Reading Captured     []  Added Reminder to Complete to Upcoming Primary Care Appt Notes     []  Patient Declined     []  Patient Will Call Back / Patient Will Send Portal Message with Reading     []  Patient Will Call Back to Schedule Provider Visit             []       HbA1c & Other Labs []  Lab Appt Scheduled for Due Labs     []  Primary Care Follow Up Visit Scheduled      []  Reminded Patient to Complete Home Test     []  Added Reminder to Complete to Upcoming Primary Care Appt Notes     []  Patient Declined     []  Patient Will Call Back to Schedule     []  Patient Will Schedule with External Provider / Order Routed if Applicable           []    Schedule Primary Care Appt []  Primary Care Appt Scheduled     []  Patient Declined     []  Patient Will Call Back to Schedule     []  Pt Established with External Provider & Updated Care Team             []      Medication Adherence []  Primary Care Appointment Scheduled     []  Added Reminder to Upcoming Primary Care Appt Notes     []  Patient Reminded to  Prescription     []  Patient Declined, Provider Notified if Needed     []  Sent Provider Message to Review and/or Add Exclusion to Problem List             []      Osteoporosis Screening []  DXA Appointment Scheduled     []  External Records Requested     []  Added Reminder to Complete to Upcoming Primary Care Appt Notes     []  Patient Declined     []  Patient Will Call Back to Schedule     []  Patient Will Schedule with External Provider / Order Routed if Applicable     Additional Care Coordinator Notes:     Requesting records from Dr. Silvia Nichole in [place], [place]. Upon Chart Review found in Carmen's note that pt had EGD and Colonoscopy done 1-2 years ago. Care Team  updated. Mammogram ordered on 6/12/23.    Further Action Needed If Patient Returns Outreach:      Records to be hyperlinked when received.    If you have any questions please give me a call at [phone].    Jessica Butler MA - Panel Care Coordinator

## 2023-06-14 NOTE — LETTER
AUTHORIZATION FOR RELEASE OF   CONFIDENTIAL INFORMATION    Dear Dr. Nichole,  Fax: [phone]    We are seeing Ana María Qureshi, date of birth 1970, in the clinic at [facility]. SANDOR Roblero is the patient's PCP. Ana María Qureshi has an outstanding lab/procedure at the time we reviewed her chart. In order to help keep her health information updated, she has authorized us to request the following medical record(s):        (  )  MAMMOGRAM                                      ( x )  COLONOSCOPY      (  )  PAP SMEAR                                          (  )  OUTSIDE LAB RESULTS     (  )  DEXA SCAN                                          (  )  EYE EXAM            (  )  FOOT EXAM                                          (  )  ENTIRE RECORD     (  )  OUTSIDE IMMUNIZATIONS                 (  )  _______________         Please fax records to [facility], Kelly Whitehead, FNP, [phone].           Patient Name: Ana María Qureshi  : 1970  Patient Phone #: [phone]

## 2023-06-20 ENCOUNTER — OFFICE VISIT (OUTPATIENT)
Dept: OBSTETRICS AND GYNECOLOGY | Facility: CLINIC | Age: 53
End: 2023-06-20
Payer: MEDICAID

## 2023-06-20 ENCOUNTER — OFFICE VISIT (OUTPATIENT)
Dept: FAMILY MEDICINE | Facility: CLINIC | Age: 53
End: 2023-06-20
Payer: MEDICAID

## 2023-06-20 VITALS
DIASTOLIC BLOOD PRESSURE: 80 MMHG | BODY MASS INDEX: 31.28 KG/M2 | WEIGHT: 170 LBS | HEIGHT: 62 IN | SYSTOLIC BLOOD PRESSURE: 140 MMHG

## 2023-06-20 VITALS
HEIGHT: 62 IN | OXYGEN SATURATION: 99 % | HEART RATE: 66 BPM | DIASTOLIC BLOOD PRESSURE: 84 MMHG | BODY MASS INDEX: 31.28 KG/M2 | SYSTOLIC BLOOD PRESSURE: 150 MMHG | TEMPERATURE: 97 F | WEIGHT: 170 LBS

## 2023-06-20 DIAGNOSIS — J30.9 ALLERGIC RHINITIS, UNSPECIFIED SEASONALITY, UNSPECIFIED TRIGGER: ICD-10-CM

## 2023-06-20 DIAGNOSIS — S39.011A STRAIN OF MUSCLE OF RIGHT GROIN REGION: Primary | ICD-10-CM

## 2023-06-20 DIAGNOSIS — K25.7 CHRONIC GASTRIC ULCER WITHOUT HEMORRHAGE AND WITHOUT PERFORATION: ICD-10-CM

## 2023-06-20 DIAGNOSIS — N90.89 LESION OF LABIA: ICD-10-CM

## 2023-06-20 DIAGNOSIS — L73.9 FOLLICULITIS: Primary | ICD-10-CM

## 2023-06-20 DIAGNOSIS — I10 PRIMARY HYPERTENSION: ICD-10-CM

## 2023-06-20 PROCEDURE — 3008F PR BODY MASS INDEX (BMI) DOCUMENTED: ICD-10-PCS | Mod: CPTII,,, | Performed by: OBSTETRICS & GYNECOLOGY

## 2023-06-20 PROCEDURE — 4010F ACE/ARB THERAPY RXD/TAKEN: CPT | Mod: CPTII,,, | Performed by: OBSTETRICS & GYNECOLOGY

## 2023-06-20 PROCEDURE — 3077F PR MOST RECENT SYSTOLIC BLOOD PRESSURE >= 140 MM HG: ICD-10-PCS | Mod: CPTII,,, | Performed by: NURSE PRACTITIONER

## 2023-06-20 PROCEDURE — 4010F PR ACE/ARB THEARPY RXD/TAKEN: ICD-10-PCS | Mod: CPTII,,, | Performed by: OBSTETRICS & GYNECOLOGY

## 2023-06-20 PROCEDURE — 4010F PR ACE/ARB THEARPY RXD/TAKEN: ICD-10-PCS | Mod: CPTII,,, | Performed by: NURSE PRACTITIONER

## 2023-06-20 PROCEDURE — 3079F DIAST BP 80-89 MM HG: CPT | Mod: CPTII,,, | Performed by: OBSTETRICS & GYNECOLOGY

## 2023-06-20 PROCEDURE — 3079F PR MOST RECENT DIASTOLIC BLOOD PRESSURE 80-89 MM HG: ICD-10-PCS | Mod: CPTII,,, | Performed by: NURSE PRACTITIONER

## 2023-06-20 PROCEDURE — 3008F BODY MASS INDEX DOCD: CPT | Mod: CPTII,,, | Performed by: NURSE PRACTITIONER

## 2023-06-20 PROCEDURE — 99213 PR OFFICE/OUTPT VISIT, EST, LEVL III, 20-29 MIN: ICD-10-PCS | Mod: ,,, | Performed by: OBSTETRICS & GYNECOLOGY

## 2023-06-20 PROCEDURE — 99214 OFFICE O/P EST MOD 30 MIN: CPT | Mod: ,,, | Performed by: NURSE PRACTITIONER

## 2023-06-20 PROCEDURE — 3077F SYST BP >= 140 MM HG: CPT | Mod: CPTII,,, | Performed by: NURSE PRACTITIONER

## 2023-06-20 PROCEDURE — 1160F RVW MEDS BY RX/DR IN RCRD: CPT | Mod: CPTII,,, | Performed by: NURSE PRACTITIONER

## 2023-06-20 PROCEDURE — 1159F MED LIST DOCD IN RCRD: CPT | Mod: CPTII,,, | Performed by: NURSE PRACTITIONER

## 2023-06-20 PROCEDURE — 3077F SYST BP >= 140 MM HG: CPT | Mod: CPTII,,, | Performed by: OBSTETRICS & GYNECOLOGY

## 2023-06-20 PROCEDURE — 3008F BODY MASS INDEX DOCD: CPT | Mod: CPTII,,, | Performed by: OBSTETRICS & GYNECOLOGY

## 2023-06-20 PROCEDURE — 3079F PR MOST RECENT DIASTOLIC BLOOD PRESSURE 80-89 MM HG: ICD-10-PCS | Mod: CPTII,,, | Performed by: OBSTETRICS & GYNECOLOGY

## 2023-06-20 PROCEDURE — 1159F PR MEDICATION LIST DOCUMENTED IN MEDICAL RECORD: ICD-10-PCS | Mod: CPTII,,, | Performed by: NURSE PRACTITIONER

## 2023-06-20 PROCEDURE — 99214 PR OFFICE/OUTPT VISIT, EST, LEVL IV, 30-39 MIN: ICD-10-PCS | Mod: ,,, | Performed by: NURSE PRACTITIONER

## 2023-06-20 PROCEDURE — 3008F PR BODY MASS INDEX (BMI) DOCUMENTED: ICD-10-PCS | Mod: CPTII,,, | Performed by: NURSE PRACTITIONER

## 2023-06-20 PROCEDURE — 3077F PR MOST RECENT SYSTOLIC BLOOD PRESSURE >= 140 MM HG: ICD-10-PCS | Mod: CPTII,,, | Performed by: OBSTETRICS & GYNECOLOGY

## 2023-06-20 PROCEDURE — 1160F PR REVIEW ALL MEDS BY PRESCRIBER/CLIN PHARMACIST DOCUMENTED: ICD-10-PCS | Mod: CPTII,,, | Performed by: NURSE PRACTITIONER

## 2023-06-20 PROCEDURE — 99213 OFFICE O/P EST LOW 20 MIN: CPT | Mod: ,,, | Performed by: OBSTETRICS & GYNECOLOGY

## 2023-06-20 PROCEDURE — 4010F ACE/ARB THERAPY RXD/TAKEN: CPT | Mod: CPTII,,, | Performed by: NURSE PRACTITIONER

## 2023-06-20 PROCEDURE — 3079F DIAST BP 80-89 MM HG: CPT | Mod: CPTII,,, | Performed by: NURSE PRACTITIONER

## 2023-06-20 RX ORDER — FLUTICASONE PROPIONATE 50 MCG
2 SPRAY, SUSPENSION (ML) NASAL DAILY
Qty: 15.8 ML | Refills: 11 | Status: SHIPPED | OUTPATIENT
Start: 2023-06-20 | End: 2023-06-27 | Stop reason: SDUPTHER

## 2023-06-20 RX ORDER — TIZANIDINE 4 MG/1
4 TABLET ORAL EVERY 8 HOURS PRN
Qty: 90 TABLET | Refills: 0 | Status: SHIPPED | OUTPATIENT
Start: 2023-06-20 | End: 2023-07-21 | Stop reason: SDUPTHER

## 2023-06-20 RX ORDER — LEVOTHYROXINE SODIUM 75 UG/1
75 TABLET ORAL
COMMUNITY
Start: 2023-01-26 | End: 2023-06-20

## 2023-06-20 RX ORDER — PANTOPRAZOLE SODIUM 40 MG/1
40 TABLET, DELAYED RELEASE ORAL DAILY
Qty: 30 TABLET | Refills: 0 | Status: SHIPPED | OUTPATIENT
Start: 2023-06-20 | End: 2023-06-27 | Stop reason: SDUPTHER

## 2023-06-20 RX ORDER — OLMESARTAN MEDOXOMIL 40 MG/1
40 TABLET ORAL DAILY
Qty: 90 TABLET | Refills: 3 | Status: SHIPPED | OUTPATIENT
Start: 2023-06-20 | End: 2023-06-27 | Stop reason: SDUPTHER

## 2023-06-20 RX ORDER — SULFAMETHOXAZOLE AND TRIMETHOPRIM 800; 160 MG/1; MG/1
1 TABLET ORAL 2 TIMES DAILY
Qty: 14 TABLET | Refills: 0 | Status: SHIPPED | OUTPATIENT
Start: 2023-06-20 | End: 2023-06-27

## 2023-06-20 RX ORDER — FAMOTIDINE 20 MG/1
20 TABLET, FILM COATED ORAL 2 TIMES DAILY
Qty: 180 TABLET | Refills: 3 | Status: SHIPPED | OUTPATIENT
Start: 2023-06-20 | End: 2023-06-27 | Stop reason: SDUPTHER

## 2023-06-20 NOTE — PROGRESS NOTES
"Patient ID: Ana María Qureshi is a 52 y.o. female.    Chief Complaint: Follow-up (F/u B/P and leg)                     History of Present Illness:  The patient is 52 y.o. White female for evaluation and management with a chief complaint of Follow-up (F/u B/P and leg) .  At last visit, chlorthalidone 25 mg daily was added to olmesartan 40 mg daily for hypertension.  Morning blood pressure readings improved at home with an average of 130/70 but evening readings elevated at 170/110 after work.    No improvement in groin pain since beginning Voltaren gel.  Pain is present with position changes and ambulation.  Began 3-4 weeks ago after attempting to  a family member from the floor.  Pain is also much worse when she is on her feet for several hours a day.  Using ibuprofen sparingly due to history of gastric ulcer. Requesting refill on tizanidine and famotidine.      ROS: See HPI    Past Medical History:  has a past medical history of Anemia, Hypertension, and Mental disorder.    Current Outpatient Medications   Medication Instructions    chlorthalidone (HYGROTEN) 25 mg, Oral, Daily    famotidine (PEPCID) 20 mg, Oral, 2 times daily    fluticasone propionate (FLONASE) 100 mcg, Each Nostril, Daily    gabapentin (NEURONTIN) 800 mg, Oral, 3 times daily    LORazepam (ATIVAN) 1 MG tablet No dose, route, or frequency recorded.    olmesartan (BENICAR) 40 mg, Oral, Daily    sucralfate (CARAFATE) 1 gram tablet TAKE 1 TABLET BY MOUTH FOUR TIMES A DAY ONE HOUR BEFORE MEALS    sulfamethoxazole-trimethoprim 800-160mg (BACTRIM DS) 800-160 mg Tab 1 tablet, Oral, 2 times daily    tiZANidine (ZANAFLEX) 4 mg, Oral, Every 8 hours PRN    zolpidem (AMBIEN) 10 mg Tab No dose, route, or frequency recorded.       Patient has No Known Allergies.       Visit Vitals  BP (!) 150/84   Pulse 66   Temp 97.2 °F (36.2 °C) (Temporal)   Ht 5' 2" (1.575 m)   Wt 77.1 kg (170 lb)   SpO2 99%   BMI 31.09 kg/m²         Physical Exam  Vitals reviewed. "   Constitutional:       Appearance: Normal appearance.   Cardiovascular:      Rate and Rhythm: Normal rate and regular rhythm.      Heart sounds: Normal heart sounds.   Pulmonary:      Effort: Pulmonary effort is normal. No respiratory distress.      Breath sounds: Normal breath sounds.   Abdominal:      General: Bowel sounds are normal.      Palpations: Abdomen is soft.      Tenderness: There is no abdominal tenderness.   Musculoskeletal:      Comments: Tenderness to right medial upper thigh.  Pain with abduction of hip   Skin:     General: Skin is warm and dry.   Neurological:      Mental Status: She is alert.       Recent Labs Obtained:  No visits with results within 7 Day(s) from this visit.   Latest known visit with results is:   Office Visit on 05/16/2023   Component Date Value Ref Range Status    Sodium Level 05/16/2023 138  135 - 145 mmol/L Final    Potassium Level 05/16/2023 4.7  3.5 - 5.1 mmol/L Final    Chloride 05/16/2023 106  98 - 110 mmol/L Final    Carbon Dioxide 05/16/2023 26  21 - 32 mmol/L Final    Glucose Level 05/16/2023 92  70 - 115 mg/dL Final    Blood Urea Nitrogen 05/16/2023 16.0  7.0 - 20.0 mg/dL Final    Creatinine 05/16/2023 0.69  0.66 - 1.25 mg/dL Final    Calcium Level Total 05/16/2023 9.1  8.4 - 10.2 mg/dL Final    Protein Total 05/16/2023 6.7  6.3 - 8.2 gm/dL Final    Albumin Level 05/16/2023 4.1  3.4 - 5.0 g/dL Final    Globulin 05/16/2023 2.6  2.0 - 3.9 gm/dL Final    Albumin/Globulin Ratio 05/16/2023 1.6  ratio Final    Bilirubin Total 05/16/2023 0.3  0.0 - 1.0 mg/dL Final    Alkaline Phosphatase 05/16/2023 68  50 - 144 unit/L Final    Alanine Aminotransferase 05/16/2023 19  1 - 45 unit/L Final    Aspartate Aminotransferase 05/16/2023 31  14 - 36 unit/L Final    eGFR 05/16/2023 >90  mls/min/1.73/m2 Final                         EGFR INTERPRETATION    Beginning 8/15/22 we are reporting the eGFRcr calculation as recommended by the National Kid[employer]. The eGFRcr equation has  similar overall performance characteristics to the older equation, but the values may differ by more than 10% particularly at higher values of eGFRcr and younger adult ages.    NKF stages of chronic kidney disease (CKD)  Stage 1: Kidney damage with normal or increased eGFR (>90 mL/min/1.73 m^2)  Stage 2: Mild reduction in GFR (60-89 mL/min/1.73 m^2)  Stage 3a: Moderate reduction in GFR (45-59 mL/min/1.73 m^2)  Stage 3b: Moderate reduction in GFR (30-44 mL/min/1.73 m^2)  Stage 4: Severe reduction in GFR (15-29 mL/min/1.73 m^2)  Stage 5: Kidney failure (GFR <15 mL/min/1.73 m^2)        Anion Gap 05/16/2023 6.0  2.0 - 13.0 mEq/L Final    BUN/Creatinine Ratio 05/16/2023 23 (H)  12 - 20 Final    Cholesterol Total 05/16/2023 174  0 - 200 mg/dL Final    HDL Cholesterol 05/16/2023 62 (H)  40 - 60 mg/dL Final    Triglyceride 05/16/2023 85  30 - 200 mg/dL Final    LDL Cholesterol Direct 05/16/2023 82.8  30.0 - 100.0 mg/dL Final    Hemoglobin A1c 05/16/2023 5.5  4.0 - 6.0 % Final    Estimated Average Glucose 05/16/2023 111.2  70.0 - 115.0 mg/dL Final    Thyroid Stimulating Hormone 05/16/2023 1.570  0.360 - 3.740 uIU/mL Final    Iron Binding Capacity Unsaturated 05/16/2023 231  70 - 310 ug/dL Final    Iron Level 05/16/2023 84  50 - 170 ug/dL Final    Transferrin 05/16/2023 285  180 - 382 mg/dL Final    Iron Binding Capacity Total 05/16/2023 315  250 - 450 ug/dL Final    Iron Saturation 05/16/2023 27  20 - 50 % Final    Ferritin Level 05/16/2023 8.54  6.24 - 264.00 ng/mL Final    Vit D 25 OH 05/16/2023 <20.0 (L)  30.0 - 100.0 ng/mL Final    WBC 05/16/2023 8.63  4.00 - 11.50 x10(3)/mcL Final    RBC 05/16/2023 4.67  4.00 - 5.10 x10(6)/mcL Final    Hgb 05/16/2023 13.6  11.8 - 16.0 g/dL Final    Hct 05/16/2023 41.4  36.0 - 48.0 % Final    MCV 05/16/2023 88.7  79.0 - 99.0 fL Final    MCH 05/16/2023 29.1  27.0 - 34.0 pg Final    MCHC 05/16/2023 32.9  31.0 - 37.0 g/dL Final    RDW 05/16/2023 15.0 (H)  11.0 - 14.5 % Final    Platelet  05/16/2023 247  140 - 371 x10(3)/mcL Final    MPV 05/16/2023 12.0  9.4 - 12.4 fL Final    Neut % 05/16/2023 68.5  37 - 73 % Final    Lymph % 05/16/2023 23.3  20 - 55 % Final    Mono % 05/16/2023 6.4  4.7 - 12.5 % Final    Eos % 05/16/2023 1.0  0.7 - 7 % Final    Basophil % 05/16/2023 0.6  0.1 - 1.2 % Final    Lymph # 05/16/2023 2.01  1.16 - 3.74 x10(3)/mcL Final    Neut # 05/16/2023 5.91  1.56 - 6.13 x10(3)/mcL Final    Mono # 05/16/2023 0.55 (H)  0.24 - 0.36 x10(3)/mcL Final    Eos # 05/16/2023 0.09  0.04 - 0.36 x10(3)/mcL Final    Baso # 05/16/2023 0.05  0.01 - 0.08 x10(3)/mcL Final    IG# 05/16/2023 0.02  0.0001 - 0.031 x10(3)/mcL Final    IG% 05/16/2023 0.2  0 - 0.5 % Final    NRBC% 05/16/2023 0.0  <=1 % Final         Assessment/Plan:    ICD-10-CM ICD-9-CM   1. Strain of muscle of right groin region  S39.011A 848.8   2. Primary hypertension  I10 401.9   3. Allergic rhinitis, unspecified seasonality, unspecified trigger  J30.9 477.9   4. Chronic gastric ulcer without hemorrhage and without perforation  K25.7 531.70       1. Strain of muscle of right groin region  Refer to PT for evaluation and treatment   Heat as needed   Continue tizanidine 4 mg every 8 hours   NSAIDs sparingly due to history of bleeding gastric ulcer  - tiZANidine (ZANAFLEX) 4 MG tablet; Take 1 tablet (4 mg total) by mouth every 8 (eight) hours as needed (pain).  Dispense: 90 tablet; Refill: 0    2. Primary hypertension  Remains elevated but somewhat improved after beginning chlorthalidone 25 mg daily 1 week ago  Continue chlorthalidone and olmesartan 40 mg daily   Low-sodium diet   Plan to add amlodipine next week if blood pressure remains elevated  - olmesartan (BENICAR) 40 MG tablet; Take 1 tablet (40 mg total) by mouth once daily.  Dispense: 90 tablet; Refill: 3    3. Allergic rhinitis, unspecified seasonality, unspecified trigger  - fluticasone propionate (FLONASE) 50 mcg/actuation nasal spray; 2 sprays (100 mcg total) by Each Nostril  route once daily.  Dispense: 15.8 mL; Refill: 11    4. Chronic gastric ulcer without hemorrhage and without perforation  - famotidine (PEPCID) 20 MG tablet; Take 1 tablet (20 mg total) by mouth 2 (two) times daily.  Dispense: 180 tablet; Refill: 3  Begin pantoprazole 40 mg daily         Follow up in about 1 week (around 6/27/2023) for BP . or sooner as needed.    Future Appointments   Date Time Provider Department Center   6/28/2023  8:00 AM OA [facility]   7/6/2023 10:40 AM Sy Salgado [place] [facility]   2/6/2024 10:30 AM Rafael Freitas MD [id number]  SANDOR Mckenzie

## 2023-06-20 NOTE — PROGRESS NOTES
Chief Complaint     vaginal bump    HPI:     Patient is a 52 y.o.  s/p partial hysterectomy presents today as a new patient referred by Carmen Henry for labia lesion. Reports has noticed to have vaginal bump- white, hard, tender to touch since shaving 1 month ago. Reports MMG with NP.     MENOPAUSAL:  Age at menarche: 10  Age at menopause: NA  Hysterectomy:  Yes, Due to heavy bleeding/ Irregular   Last pap: 2018, Pt believes  H/o Abnormal Pap: Yes , Was uncertain results   Colposcopy: NO  Postcoital Bleeding: No  Sexually Active: Yes   Dyspareunia: No  H/o STI: No   HRT: NO  MM+ years, Approx 2018  H/o abnl MMG: NO   Colonoscopy: 2018    Past Medical History:   Diagnosis Date    Anemia     Hypertension     Mental disorder        Past Surgical History:   Procedure Laterality Date    AUGMENTATION OF BREAST Bilateral     2005    CARPAL TUNNEL RELEASE Left 2020     SECTION  1991    GASTRIC BYPASS  2005    HERNIA REPAIR      x 10    HYSTERECTOMY  2002    Vaginal / Partial    TONSILLECTOMY      TUBAL LIGATION  1994       Family History   Problem Relation Age of Onset    Hypertension Father     Lung cancer Father     Glaucoma Father     Hypertension Mother     COPD Mother     Heart disease Mother     Kidney failure Mother     Pancreatic cancer Maternal Uncle     Lymphoma Maternal Uncle     Breast cancer Neg Hx     Uterine cancer Neg Hx     Cervical cancer Neg Hx     Ovarian cancer Neg Hx     Colon cancer Neg Hx        OB History          3    Para   3    Term   2       1    AB        Living   3         SAB        IAB        Ectopic        Multiple        Live Births   3                 Current Outpatient Medications on File Prior to Visit   Medication Sig Dispense Refill    chlorthalidone (HYGROTEN) 25 MG Tab Take 1 tablet (25 mg total) by mouth once daily. 30 tablet 0    diclofenac sodium (VOLTAREN) 1 % Gel Apply 2 g topically 4 (four) times daily as needed (pain). 50 g 0     "gabapentin (NEURONTIN) 800 MG tablet Take 1 tablet (800 mg total) by mouth 3 (three) times daily. 90 tablet 0    levothyroxine (SYNTHROID) 75 MCG tablet Take 75 mcg by mouth.      LORazepam (ATIVAN) 1 MG tablet       naproxen (NAPROSYN) 500 MG tablet Take 500 mg by mouth 2 (two) times daily.      olmesartan (BENICAR) 40 MG tablet TAKE 1 TABLET BY MOUTH ONCE DAILY 30 tablet 0    sucralfate (CARAFATE) 1 gram tablet TAKE 1 TABLET BY MOUTH FOUR TIMES A DAY ONE HOUR BEFORE MEALS      tiZANidine (ZANAFLEX) 4 MG tablet Take 1 tablet (4 mg total) by mouth every 8 (eight) hours as needed (pain). 30 tablet 0    zolpidem (AMBIEN) 10 mg Tab       famotidine (PEPCID) 20 MG tablet Take 1 tablet (20 mg total) by mouth 2 (two) times daily. 60 tablet 0     No current facility-administered medications on file prior to visit.       Review of Systems:       Review of Systems   Constitutional: Negative.    HENT: Negative.     Gastrointestinal: Negative.    Genitourinary:  Positive for genital sores.   Neurological: Negative.    Psychiatric/Behavioral: Negative.        Physical Exam:    BP (!) 140/80   Ht 5' 2" (1.575 m)   Wt 77.1 kg (169 lb 15.6 oz)   BMI 31.09 kg/m²     Physical Exam   General Exam:    General Appearance: alert, in no acute distress, normal, well nourished.  Psych:  Orientation: time, place, person.  Mood/Affect: Normal   Abdomen:  - Soft. Non-tender. No rebound tenderness or guardingNo masses. Liver normal. Spleen normal. No hernia palpable.  Pelvis:   - Vulva: base of left labia majora: 2-3mm firm area.  Possible folliculitis vs small subaceaus cyst. Cleaned with iodine, probed with 18 gauge needle, no drainage expressed.    - Perianal skin: Normal   - Urethra: Normal meatus. - Vagina: NormalVaginal discharge present: . Cystocele: Absent. Rectocele: Absent        Assessment:   1. Folliculitis  -     Mammo Digital Screening Bilat w/ Erick; Future; Expected date: 06/20/2023    2. Lesion of labia  -     Ambulatory " referral/consult to Obstetrics / Gynecology    Other orders  -     sulfamethoxazole-trimethoprim 800-160mg (BACTRIM DS) 800-160 mg Tab; Take 1 tablet by mouth 2 (two) times daily. for 7 days  Dispense: 14 tablet; Refill: 0             Plan:   Discussed exam findings  Rx: bactrim po BID x7 days   Warm compresses, soaks  Infection precautions  RTC 2 weeks for vaginal exam following completion of medication

## 2023-06-27 ENCOUNTER — OFFICE VISIT (OUTPATIENT)
Dept: FAMILY MEDICINE | Facility: CLINIC | Age: 53
End: 2023-06-27
Payer: MEDICAID

## 2023-06-27 VITALS
OXYGEN SATURATION: 97 % | TEMPERATURE: 97 F | BODY MASS INDEX: 31.5 KG/M2 | SYSTOLIC BLOOD PRESSURE: 130 MMHG | HEIGHT: 62 IN | DIASTOLIC BLOOD PRESSURE: 78 MMHG | WEIGHT: 171.19 LBS | HEART RATE: 63 BPM

## 2023-06-27 DIAGNOSIS — M19.90 OSTEOARTHRITIS, UNSPECIFIED OSTEOARTHRITIS TYPE, UNSPECIFIED SITE: ICD-10-CM

## 2023-06-27 DIAGNOSIS — I10 PRIMARY HYPERTENSION: ICD-10-CM

## 2023-06-27 DIAGNOSIS — S39.011A STRAIN OF MUSCLE OF RIGHT GROIN REGION: ICD-10-CM

## 2023-06-27 DIAGNOSIS — Z86.010 HISTORY OF COLON POLYPS: ICD-10-CM

## 2023-06-27 DIAGNOSIS — G62.9 POLYNEUROPATHY: ICD-10-CM

## 2023-06-27 DIAGNOSIS — M25.551 RIGHT HIP PAIN: Primary | ICD-10-CM

## 2023-06-27 DIAGNOSIS — J30.9 ALLERGIC RHINITIS, UNSPECIFIED SEASONALITY, UNSPECIFIED TRIGGER: ICD-10-CM

## 2023-06-27 DIAGNOSIS — K25.7 CHRONIC GASTRIC ULCER WITHOUT HEMORRHAGE AND WITHOUT PERFORATION: ICD-10-CM

## 2023-06-27 PROCEDURE — 3075F PR MOST RECENT SYSTOLIC BLOOD PRESS GE 130-139MM HG: ICD-10-PCS | Mod: CPTII,,, | Performed by: NURSE PRACTITIONER

## 2023-06-27 PROCEDURE — 1159F PR MEDICATION LIST DOCUMENTED IN MEDICAL RECORD: ICD-10-PCS | Mod: CPTII,,, | Performed by: NURSE PRACTITIONER

## 2023-06-27 PROCEDURE — 99214 PR OFFICE/OUTPT VISIT, EST, LEVL IV, 30-39 MIN: ICD-10-PCS | Mod: ,,, | Performed by: NURSE PRACTITIONER

## 2023-06-27 PROCEDURE — 4010F PR ACE/ARB THEARPY RXD/TAKEN: ICD-10-PCS | Mod: CPTII,,, | Performed by: NURSE PRACTITIONER

## 2023-06-27 PROCEDURE — 3078F DIAST BP <80 MM HG: CPT | Mod: CPTII,,, | Performed by: NURSE PRACTITIONER

## 2023-06-27 PROCEDURE — 4010F ACE/ARB THERAPY RXD/TAKEN: CPT | Mod: CPTII,,, | Performed by: NURSE PRACTITIONER

## 2023-06-27 PROCEDURE — 3008F PR BODY MASS INDEX (BMI) DOCUMENTED: ICD-10-PCS | Mod: CPTII,,, | Performed by: NURSE PRACTITIONER

## 2023-06-27 PROCEDURE — 1159F MED LIST DOCD IN RCRD: CPT | Mod: CPTII,,, | Performed by: NURSE PRACTITIONER

## 2023-06-27 PROCEDURE — 3078F PR MOST RECENT DIASTOLIC BLOOD PRESSURE < 80 MM HG: ICD-10-PCS | Mod: CPTII,,, | Performed by: NURSE PRACTITIONER

## 2023-06-27 PROCEDURE — 3075F SYST BP GE 130 - 139MM HG: CPT | Mod: CPTII,,, | Performed by: NURSE PRACTITIONER

## 2023-06-27 PROCEDURE — 1160F PR REVIEW ALL MEDS BY PRESCRIBER/CLIN PHARMACIST DOCUMENTED: ICD-10-PCS | Mod: CPTII,,, | Performed by: NURSE PRACTITIONER

## 2023-06-27 PROCEDURE — 3008F BODY MASS INDEX DOCD: CPT | Mod: CPTII,,, | Performed by: NURSE PRACTITIONER

## 2023-06-27 PROCEDURE — 99214 OFFICE O/P EST MOD 30 MIN: CPT | Mod: ,,, | Performed by: NURSE PRACTITIONER

## 2023-06-27 PROCEDURE — 1160F RVW MEDS BY RX/DR IN RCRD: CPT | Mod: CPTII,,, | Performed by: NURSE PRACTITIONER

## 2023-06-27 RX ORDER — OLMESARTAN MEDOXOMIL 40 MG/1
40 TABLET ORAL DAILY
Qty: 90 TABLET | Refills: 3 | Status: SHIPPED | OUTPATIENT
Start: 2023-06-27 | End: 2023-08-24 | Stop reason: SDUPTHER

## 2023-06-27 RX ORDER — FAMOTIDINE 20 MG/1
20 TABLET, FILM COATED ORAL 2 TIMES DAILY
Qty: 180 TABLET | Refills: 3 | Status: SHIPPED | OUTPATIENT
Start: 2023-06-27 | End: 2023-08-24 | Stop reason: SDUPTHER

## 2023-06-27 RX ORDER — FLUTICASONE PROPIONATE 50 MCG
2 SPRAY, SUSPENSION (ML) NASAL DAILY
Qty: 15.8 ML | Refills: 11 | Status: SHIPPED | OUTPATIENT
Start: 2023-06-27 | End: 2023-07-27

## 2023-06-27 RX ORDER — PANTOPRAZOLE SODIUM 40 MG/1
40 TABLET, DELAYED RELEASE ORAL DAILY
Qty: 90 TABLET | Refills: 3 | Status: SHIPPED | OUTPATIENT
Start: 2023-06-27 | End: 2023-08-24 | Stop reason: SDUPTHER

## 2023-06-27 RX ORDER — CHLORTHALIDONE 25 MG/1
25 TABLET ORAL DAILY
Qty: 90 TABLET | Refills: 3 | Status: SHIPPED | OUTPATIENT
Start: 2023-06-27 | End: 2023-07-11 | Stop reason: SDUPTHER

## 2023-06-27 RX ORDER — CELECOXIB 200 MG/1
200 CAPSULE ORAL DAILY
Qty: 30 CAPSULE | Refills: 0 | Status: SHIPPED | OUTPATIENT
Start: 2023-06-27 | End: 2023-07-28

## 2023-06-27 RX ORDER — GABAPENTIN 800 MG/1
800 TABLET ORAL 3 TIMES DAILY
Qty: 90 TABLET | Refills: 2 | Status: SHIPPED | OUTPATIENT
Start: 2023-06-27 | End: 2023-07-11 | Stop reason: SDUPTHER

## 2023-06-27 NOTE — PROGRESS NOTES
Patient ID: Ana María Qureshi is a 52 y.o. female.    Chief Complaint: Follow-up (1week f/u Leg)                     History of Present Illness:  The patient is 52 y.o. White female for evaluation and management with a chief complaint of Follow-up (1week f/u Leg) .  She continues to complain of pain to her right, upper thigh.  Pain has been present for greater than 3 weeks and has worsened since onset.  Now radiates into her right hip and groin.  Pain is worse when she stands from a sitting position or if she is on her feet for a long duration.  Greatly improved with ibuprofen 800 mg 3-4 times per day.  Order given for PT at last visit but patient unable to schedule appointment due to lack of transportation.      History of gastric bypass with ulceration and history of GI bleed.  Last colonoscopy (with polyps) approximately 2018 and needs repeat.  Bowel movements have remained normal.  No constipation, diarrhea, or blood in her stool.  No black, tarry stools.    Home blood pressures have improved.  Seem to be a little more elevated in the evenings with few systolic readings of 140.     ROS: See HPI      Past Medical History:  has a past medical history of Anemia, Hypertension, and Mental disorder.    Current Outpatient Medications   Medication Instructions    chlorthalidone (HYGROTEN) 25 mg, Oral, Daily    famotidine (PEPCID) 20 mg, Oral, 2 times daily    fluticasone propionate (FLONASE) 100 mcg, Each Nostril, Daily    gabapentin (NEURONTIN) 800 mg, Oral, 3 times daily    LORazepam (ATIVAN) 1 MG tablet No dose, route, or frequency recorded.    olmesartan (BENICAR) 40 mg, Oral, Daily    pantoprazole (PROTONIX) 40 mg, Oral, Daily    sucralfate (CARAFATE) 1 gram tablet TAKE 1 TABLET BY MOUTH FOUR TIMES A DAY ONE HOUR BEFORE MEALS    sulfamethoxazole-trimethoprim 800-160mg (BACTRIM DS) 800-160 mg Tab 1 tablet, Oral, 2 times daily    tiZANidine (ZANAFLEX) 4 mg, Oral, Every 8 hours PRN    zolpidem (AMBIEN) 10 mg Tab No dose,  "route, or frequency recorded.       Patient has No Known Allergies.       Visit Vitals  /78 (BP Location: Right arm)   Pulse 63   Temp 97 °F (36.1 °C) (Temporal)   Ht 5' 2" (1.575 m)   Wt 77.7 kg (171 lb 3.2 oz)   SpO2 97%   BMI 31.31 kg/m²         Physical Exam  Vitals reviewed.   Constitutional:       Appearance: Normal appearance.   Cardiovascular:      Rate and Rhythm: Normal rate and regular rhythm.      Heart sounds: Normal heart sounds.   Pulmonary:      Effort: Pulmonary effort is normal.      Breath sounds: Normal breath sounds.   Abdominal:      General: Bowel sounds are normal.      Palpations: Abdomen is soft.   Musculoskeletal:      Comments: Right hip:  Tender to palpation.  Pain with abduction and adduction.  Tenderness to right groin.   Skin:     General: Skin is warm and dry.   Neurological:      Mental Status: She is alert and oriented to person, place, and time. Mental status is at baseline.   Psychiatric:         Mood and Affect: Mood normal.         Thought Content: Thought content normal.         Judgment: Judgment normal.           Assessment/Plan:    ICD-10-CM ICD-9-CM   1. Right hip pain  M25.551 719.45   2. Strain of muscle of right groin region  S39.011A 848.8   3. History of colon polyps  Z86.010 V12.72   4. Primary hypertension  I10 401.9   5. Polyneuropathy  G62.9 356.9   6. Chronic gastric ulcer without hemorrhage and without perforation  K25.7 531.70   7. Allergic rhinitis, unspecified seasonality, unspecified trigger  J30.9 477.9       1. Right hip pain  Obtain x-ray of right hip  Stop ibuprofen and begin Celebrex 200 mg daily.  We had a long discussion regarding the potential for ulceration/hemorrhage with NSAIDs and patient verbalizes understanding.  States that NSAIDs are the only way that she can make it through the day with the pain in her groin and hip  Ice, home stretching exercises  - X-Ray Hip 2 or 3 views Right (with Pelvis when performed); Future    2. Strain of " muscle of right groin region  Begin physical therapy    3. History of colon polyps  - Ambulatory referral/consult to General Surgery; Future    4. Primary hypertension  Much improved today   Home blood pressure log reviewed   Continue chlorthalidone 25 mg daily and olmesartan 40 mg daily  - chlorthalidone (HYGROTEN) 25 MG Tab; Take 1 tablet (25 mg total) by mouth once daily.  Dispense: 90 tablet; Refill: 3  - olmesartan (BENICAR) 40 MG tablet; Take 1 tablet (40 mg total) by mouth once daily.  Dispense: 90 tablet; Refill: 3    5. Polyneuropathy  - gabapentin (NEURONTIN) 800 MG tablet; Take 1 tablet (800 mg total) by mouth 3 (three) times daily.  Dispense: 90 tablet; Refill: 2    6. Chronic gastric ulcer without hemorrhage and without perforation  - pantoprazole (PROTONIX) 40 MG tablet; Take 1 tablet (40 mg total) by mouth once daily.  Dispense: 90 tablet; Refill: 3  - famotidine (PEPCID) 20 MG tablet; Take 1 tablet (20 mg total) by mouth 2 (two) times daily.  Dispense: 180 tablet; Refill: 3    7. Allergic rhinitis, unspecified seasonality, unspecified trigger  - fluticasone propionate (FLONASE) 50 mcg/actuation nasal spray; 2 sprays (100 mcg total) by Each Nostril route once daily.  Dispense: 15.8 mL; Refill: 11       Follow up in about 2 weeks (around 7/11/2023) for HTN with log. or sooner as needed.    Future Appointments   Date Time Provider Department Center   6/28/2023  8:00 AM OA [facility]1 [facility]   7/6/2023 10:40 AM Sy Salgado MD [facility] [facility] [place] [facility]   7/11/2023 10:00 AM SANDOR Roblero   2/6/2024 10:30 AM Rafael Freitas MD [id number]  SANDOR Mckenzie

## 2023-07-11 ENCOUNTER — OFFICE VISIT (OUTPATIENT)
Dept: FAMILY MEDICINE | Facility: CLINIC | Age: 53
End: 2023-07-11
Payer: MEDICAID

## 2023-07-11 VITALS
BODY MASS INDEX: 31.28 KG/M2 | SYSTOLIC BLOOD PRESSURE: 138 MMHG | TEMPERATURE: 98 F | WEIGHT: 170 LBS | HEART RATE: 65 BPM | OXYGEN SATURATION: 98 % | HEIGHT: 62 IN | DIASTOLIC BLOOD PRESSURE: 92 MMHG

## 2023-07-11 DIAGNOSIS — Z72.0 NICOTINE USE: ICD-10-CM

## 2023-07-11 DIAGNOSIS — G62.9 POLYNEUROPATHY: ICD-10-CM

## 2023-07-11 DIAGNOSIS — M25.551 RIGHT HIP PAIN: ICD-10-CM

## 2023-07-11 DIAGNOSIS — I10 PRIMARY HYPERTENSION: ICD-10-CM

## 2023-07-11 DIAGNOSIS — M79.604 RIGHT LEG PAIN: Primary | ICD-10-CM

## 2023-07-11 DIAGNOSIS — Z12.2 SCREENING FOR LUNG CANCER: ICD-10-CM

## 2023-07-11 PROCEDURE — 1160F PR REVIEW ALL MEDS BY PRESCRIBER/CLIN PHARMACIST DOCUMENTED: ICD-10-PCS | Mod: CPTII,,, | Performed by: NURSE PRACTITIONER

## 2023-07-11 PROCEDURE — 3075F SYST BP GE 130 - 139MM HG: CPT | Mod: CPTII,,, | Performed by: NURSE PRACTITIONER

## 2023-07-11 PROCEDURE — 3080F DIAST BP >= 90 MM HG: CPT | Mod: CPTII,,, | Performed by: NURSE PRACTITIONER

## 2023-07-11 PROCEDURE — 99214 OFFICE O/P EST MOD 30 MIN: CPT | Mod: ,,, | Performed by: NURSE PRACTITIONER

## 2023-07-11 PROCEDURE — 1159F MED LIST DOCD IN RCRD: CPT | Mod: CPTII,,, | Performed by: NURSE PRACTITIONER

## 2023-07-11 PROCEDURE — 3075F PR MOST RECENT SYSTOLIC BLOOD PRESS GE 130-139MM HG: ICD-10-PCS | Mod: CPTII,,, | Performed by: NURSE PRACTITIONER

## 2023-07-11 PROCEDURE — 3008F BODY MASS INDEX DOCD: CPT | Mod: CPTII,,, | Performed by: NURSE PRACTITIONER

## 2023-07-11 PROCEDURE — 3008F PR BODY MASS INDEX (BMI) DOCUMENTED: ICD-10-PCS | Mod: CPTII,,, | Performed by: NURSE PRACTITIONER

## 2023-07-11 PROCEDURE — 4010F ACE/ARB THERAPY RXD/TAKEN: CPT | Mod: CPTII,,, | Performed by: NURSE PRACTITIONER

## 2023-07-11 PROCEDURE — 1160F RVW MEDS BY RX/DR IN RCRD: CPT | Mod: CPTII,,, | Performed by: NURSE PRACTITIONER

## 2023-07-11 PROCEDURE — 3080F PR MOST RECENT DIASTOLIC BLOOD PRESSURE >= 90 MM HG: ICD-10-PCS | Mod: CPTII,,, | Performed by: NURSE PRACTITIONER

## 2023-07-11 PROCEDURE — 1159F PR MEDICATION LIST DOCUMENTED IN MEDICAL RECORD: ICD-10-PCS | Mod: CPTII,,, | Performed by: NURSE PRACTITIONER

## 2023-07-11 PROCEDURE — 99214 PR OFFICE/OUTPT VISIT, EST, LEVL IV, 30-39 MIN: ICD-10-PCS | Mod: ,,, | Performed by: NURSE PRACTITIONER

## 2023-07-11 PROCEDURE — 4010F PR ACE/ARB THEARPY RXD/TAKEN: ICD-10-PCS | Mod: CPTII,,, | Performed by: NURSE PRACTITIONER

## 2023-07-11 RX ORDER — GABAPENTIN 800 MG/1
800 TABLET ORAL 3 TIMES DAILY
Qty: 90 TABLET | Refills: 2 | Status: SHIPPED | OUTPATIENT
Start: 2023-07-11 | End: 2023-08-24 | Stop reason: SDUPTHER

## 2023-07-11 RX ORDER — AMLODIPINE BESYLATE 5 MG/1
5 TABLET ORAL NIGHTLY
Qty: 30 TABLET | Refills: 0 | Status: SHIPPED | OUTPATIENT
Start: 2023-07-11 | End: 2023-07-31

## 2023-07-11 RX ORDER — CHLORTHALIDONE 25 MG/1
25 TABLET ORAL DAILY
Qty: 90 TABLET | Refills: 3 | Status: SHIPPED | OUTPATIENT
Start: 2023-07-11 | End: 2023-08-15 | Stop reason: SDUPTHER

## 2023-07-11 NOTE — PROGRESS NOTES
"Patient ID: Ana María Qureshi is a 52 y.o. female.    Chief Complaint: Follow-up (2 weeks )                     History of Present Illness:  The patient is 52 y.o. White female for evaluation and management with a chief complaint of Follow-up (2 weeks ) .  Home blood pressure readings remains slightly elevated at 138/87.  More elevated in the evening than in the morning.  She continues to experience right thigh pain, hip pain, and lower back pain.  Scheduled to begin physical therapy tomorrow morning.  Mild improvement with Celebrex.  Awaiting follow-up colonoscopy and EGD with Dr. Parrish.  Smokes 1 pack of cigarettes per day.  Daily smoker for the past 40 years.  She is also awaiting initial appointment with Cardiology.    ROS: See HPI      Past Medical History:  has a past medical history of Anemia, Hypertension, and Mental disorder.    Current Outpatient Medications   Medication Instructions    amLODIPine (NORVASC) 5 mg, Oral, Nightly    celecoxib (CELEBREX) 200 mg, Oral, Daily    chlorthalidone (HYGROTEN) 25 mg, Oral, Daily    famotidine (PEPCID) 20 mg, Oral, 2 times daily    fluticasone propionate (FLONASE) 100 mcg, Each Nostril, Daily    gabapentin (NEURONTIN) 800 mg, Oral, 3 times daily    LORazepam (ATIVAN) 1 MG tablet No dose, route, or frequency recorded.    olmesartan (BENICAR) 40 mg, Oral, Daily    pantoprazole (PROTONIX) 40 mg, Oral, Daily    sucralfate (CARAFATE) 1 gram tablet TAKE 1 TABLET BY MOUTH FOUR TIMES A DAY ONE HOUR BEFORE MEALS    tiZANidine (ZANAFLEX) 4 mg, Oral, Every 8 hours PRN    zolpidem (AMBIEN) 10 mg Tab No dose, route, or frequency recorded.       Patient has No Known Allergies.       Visit Vitals  BP (!) 138/92 (BP Location: Left arm)   Pulse 65   Temp 97.5 °F (36.4 °C) (Temporal)   Ht 5' 2.01" (1.575 m)   Wt 77.1 kg (170 lb)   SpO2 98%   BMI 31.09 kg/m²         Physical Exam  Vitals reviewed.   Constitutional:       Appearance: Normal appearance.   Cardiovascular:      Rate and " Rhythm: Normal rate and regular rhythm.      Heart sounds: Murmur (systolic) heard.   Pulmonary:      Effort: Pulmonary effort is normal. No respiratory distress.      Breath sounds: Normal breath sounds.   Abdominal:      General: Bowel sounds are normal.      Palpations: Abdomen is soft.      Tenderness: There is abdominal tenderness (To right groin).   Musculoskeletal:      Cervical back: Normal range of motion and neck supple. No tenderness.      Comments: Tenderness to palpation medial aspect of right upper thigh.  Tenderness to right lateral hip.  Positive straight leg raise on the right   Lymphadenopathy:      Cervical: No cervical adenopathy.   Skin:     General: Skin is warm and dry.   Neurological:      Mental Status: She is alert and oriented to person, place, and time. Mental status is at baseline.   Psychiatric:         Mood and Affect: Mood normal.         Thought Content: Thought content normal.         Judgment: Judgment normal.         Assessment/Plan:    ICD-10-CM ICD-9-CM   1. Right leg pain  M79.604 729.5   2. Right hip pain  M25.551 719.45   3. Polyneuropathy  G62.9 356.9   4. Primary hypertension  I10 401.9   5. Nicotine use  Z72.0 305.1   6. Screening for lung cancer  Z12.2 V76.0       1. Right leg pain  - US Lower Extremity Veins Right; Future    2. Right hip pain  Continue Celebrex.  We discussed risk of bleeding/ulceration  Ice as needed  Has appointment with PT tomorrow    3. Polyneuropathy  - gabapentin (NEURONTIN) 800 MG tablet; Take 1 tablet (800 mg total) by mouth 3 (three) times daily.  Dispense: 90 tablet; Refill: 2    4. Primary hypertension  Remains elevated both at home and in office   Continue chlorthalidone 25 mg daily and olmesartan 40 mg daily  Add amlodipine 5 mg nightly   Low-sodium diet   Blood pressure log to discuss at next visit  - amLODIPine (NORVASC) 5 MG tablet; Take 1 tablet (5 mg total) by mouth every evening.  Dispense: 30 tablet; Refill: 0  - chlorthalidone  (HYGROTEN) 25 MG Tab; Take 1 tablet (25 mg total) by mouth once daily.  Dispense: 90 tablet; Refill: 3    5. Nicotine use  Encourage complete cessation  - CT Chest Lung Screening Low Dose; Future    6. Screening for lung cancer  - CT Chest Lung Screening Low Dose; Future         Follow up in about 4 weeks (around 8/8/2023) for HTN . or sooner as needed.    Future Appointments   Date Time Provider Department Center   8/15/2023 11:00 AM SANDOR Roblero [facility]   2/6/2024 10:30 AM Rafael Freitas MD [id number] Mckenzie

## 2023-07-13 DIAGNOSIS — R10.2 PELVIC PAIN: Primary | ICD-10-CM

## 2023-07-13 NOTE — PROGRESS NOTES
Please let her know that I put in an order for an x-ray of her pelvis.  She can go to the hospital at any time to have this done

## 2023-07-14 ENCOUNTER — HOSPITAL ENCOUNTER (OUTPATIENT)
Dept: RADIOLOGY | Facility: HOSPITAL | Age: 53
Discharge: HOME | End: 2023-07-14
Attending: NURSE PRACTITIONER
Payer: MEDICAID

## 2023-07-14 PROCEDURE — 72190 X-RAY EXAM OF PELVIS: CPT | Mod: TC

## 2023-07-21 ENCOUNTER — TELEPHONE (OUTPATIENT)
Dept: FAMILY MEDICINE | Facility: CLINIC | Age: 53
End: 2023-07-21
Payer: MEDICAID

## 2023-07-21 DIAGNOSIS — S39.011A STRAIN OF MUSCLE OF RIGHT GROIN REGION: ICD-10-CM

## 2023-07-21 RX ORDER — TIZANIDINE 4 MG/1
4 TABLET ORAL EVERY 8 HOURS PRN
Qty: 90 TABLET | Refills: 0 | Status: SHIPPED | OUTPATIENT
Start: 2023-07-21 | End: 2023-08-21

## 2023-07-27 ENCOUNTER — HOSPITAL ENCOUNTER (OUTPATIENT)
Dept: RADIOLOGY | Facility: HOSPITAL | Age: 53
Discharge: HOME | End: 2023-07-27
Attending: NURSE PRACTITIONER
Payer: MEDICAID

## 2023-07-27 DIAGNOSIS — M79.604 RIGHT LEG PAIN: ICD-10-CM

## 2023-07-27 DIAGNOSIS — Z72.0 NICOTINE USE: ICD-10-CM

## 2023-07-27 DIAGNOSIS — Z12.2 SCREENING FOR LUNG CANCER: ICD-10-CM

## 2023-07-27 PROCEDURE — 93971 EXTREMITY STUDY: CPT | Mod: TC,RT

## 2023-07-27 PROCEDURE — 71271 CT THORAX LUNG CANCER SCR C-: CPT | Mod: TC

## 2023-07-28 DIAGNOSIS — M19.90 OSTEOARTHRITIS, UNSPECIFIED OSTEOARTHRITIS TYPE, UNSPECIFIED SITE: ICD-10-CM

## 2023-07-28 RX ORDER — CELECOXIB 200 MG/1
CAPSULE ORAL
Qty: 30 CAPSULE | Refills: 0 | Status: SHIPPED | OUTPATIENT
Start: 2023-07-28 | End: 2023-08-21

## 2023-07-31 DIAGNOSIS — I10 PRIMARY HYPERTENSION: ICD-10-CM

## 2023-07-31 RX ORDER — AMLODIPINE BESYLATE 5 MG/1
5 TABLET ORAL NIGHTLY
Qty: 30 TABLET | Refills: 0 | Status: SHIPPED | OUTPATIENT
Start: 2023-07-31 | End: 2023-08-15 | Stop reason: SDUPTHER

## 2023-08-15 ENCOUNTER — OFFICE VISIT (OUTPATIENT)
Dept: FAMILY MEDICINE | Facility: CLINIC | Age: 53
End: 2023-08-15
Payer: MEDICAID

## 2023-08-15 VITALS
TEMPERATURE: 98 F | HEIGHT: 62 IN | WEIGHT: 171.06 LBS | BODY MASS INDEX: 31.48 KG/M2 | OXYGEN SATURATION: 97 % | HEART RATE: 69 BPM | DIASTOLIC BLOOD PRESSURE: 78 MMHG | SYSTOLIC BLOOD PRESSURE: 112 MMHG

## 2023-08-15 DIAGNOSIS — M25.551 RIGHT HIP PAIN: ICD-10-CM

## 2023-08-15 DIAGNOSIS — G62.9 POLYNEUROPATHY: ICD-10-CM

## 2023-08-15 DIAGNOSIS — I10 PRIMARY HYPERTENSION: Primary | ICD-10-CM

## 2023-08-15 DIAGNOSIS — F33.0 MILD EPISODE OF RECURRENT MAJOR DEPRESSIVE DISORDER: ICD-10-CM

## 2023-08-15 DIAGNOSIS — K25.7 CHRONIC GASTRIC ULCER WITHOUT HEMORRHAGE AND WITHOUT PERFORATION: ICD-10-CM

## 2023-08-15 PROCEDURE — 99214 PR OFFICE/OUTPT VISIT, EST, LEVL IV, 30-39 MIN: ICD-10-PCS | Mod: ,,, | Performed by: NURSE PRACTITIONER

## 2023-08-15 PROCEDURE — 1160F RVW MEDS BY RX/DR IN RCRD: CPT | Mod: CPTII,,, | Performed by: NURSE PRACTITIONER

## 2023-08-15 PROCEDURE — 3074F SYST BP LT 130 MM HG: CPT | Mod: CPTII,,, | Performed by: NURSE PRACTITIONER

## 2023-08-15 PROCEDURE — 1159F MED LIST DOCD IN RCRD: CPT | Mod: CPTII,,, | Performed by: NURSE PRACTITIONER

## 2023-08-15 PROCEDURE — 3044F HG A1C LEVEL LT 7.0%: CPT | Mod: CPTII,,, | Performed by: NURSE PRACTITIONER

## 2023-08-15 PROCEDURE — 4010F ACE/ARB THERAPY RXD/TAKEN: CPT | Mod: CPTII,,, | Performed by: NURSE PRACTITIONER

## 2023-08-15 PROCEDURE — 3008F BODY MASS INDEX DOCD: CPT | Mod: CPTII,,, | Performed by: NURSE PRACTITIONER

## 2023-08-15 PROCEDURE — 1159F PR MEDICATION LIST DOCUMENTED IN MEDICAL RECORD: ICD-10-PCS | Mod: CPTII,,, | Performed by: NURSE PRACTITIONER

## 2023-08-15 PROCEDURE — 99214 OFFICE O/P EST MOD 30 MIN: CPT | Mod: ,,, | Performed by: NURSE PRACTITIONER

## 2023-08-15 PROCEDURE — 1160F PR REVIEW ALL MEDS BY PRESCRIBER/CLIN PHARMACIST DOCUMENTED: ICD-10-PCS | Mod: CPTII,,, | Performed by: NURSE PRACTITIONER

## 2023-08-15 PROCEDURE — 3078F DIAST BP <80 MM HG: CPT | Mod: CPTII,,, | Performed by: NURSE PRACTITIONER

## 2023-08-15 PROCEDURE — 3008F PR BODY MASS INDEX (BMI) DOCUMENTED: ICD-10-PCS | Mod: CPTII,,, | Performed by: NURSE PRACTITIONER

## 2023-08-15 PROCEDURE — 4010F PR ACE/ARB THEARPY RXD/TAKEN: ICD-10-PCS | Mod: CPTII,,, | Performed by: NURSE PRACTITIONER

## 2023-08-15 PROCEDURE — 3074F PR MOST RECENT SYSTOLIC BLOOD PRESSURE < 130 MM HG: ICD-10-PCS | Mod: CPTII,,, | Performed by: NURSE PRACTITIONER

## 2023-08-15 PROCEDURE — 3078F PR MOST RECENT DIASTOLIC BLOOD PRESSURE < 80 MM HG: ICD-10-PCS | Mod: CPTII,,, | Performed by: NURSE PRACTITIONER

## 2023-08-15 PROCEDURE — 3044F PR MOST RECENT HEMOGLOBIN A1C LEVEL <7.0%: ICD-10-PCS | Mod: CPTII,,, | Performed by: NURSE PRACTITIONER

## 2023-08-15 RX ORDER — DULOXETIN HYDROCHLORIDE 30 MG/1
30 CAPSULE, DELAYED RELEASE ORAL DAILY
Qty: 30 CAPSULE | Refills: 0 | Status: SHIPPED | OUTPATIENT
Start: 2023-08-15 | End: 2023-08-24 | Stop reason: SDUPTHER

## 2023-08-15 RX ORDER — AMLODIPINE BESYLATE 5 MG/1
5 TABLET ORAL NIGHTLY
Qty: 90 TABLET | Refills: 3 | Status: SHIPPED | OUTPATIENT
Start: 2023-08-15 | End: 2023-08-24 | Stop reason: SDUPTHER

## 2023-08-15 RX ORDER — CHLORTHALIDONE 25 MG/1
25 TABLET ORAL DAILY
Qty: 90 TABLET | Refills: 3 | Status: SHIPPED | OUTPATIENT
Start: 2023-08-15 | End: 2023-08-24 | Stop reason: SDUPTHER

## 2023-08-15 NOTE — PROGRESS NOTES
Patient ID: Ana María Qureshi is a 52 y.o. female.    Chief Complaint: Hypertension (Follow up) and Leg Pain                     History of Present Illness:  The patient is 52 y.o. White female for evaluation and management with a chief complaint of Hypertension (Follow up) and Leg Pain .  Blood pressure much improved after adding amlodipine 5 mg nightly.  Forgot log for review.      She continues to experience pain to her right thigh and hip.  Pain improves with Celebrex but seems to return a few hours after administration of medication.  Pain is worsened by position changes and with overuse.  She completed 3 weeks of physical therapy and reports that it was recommended that therapy sessions be extended to continue strengthening and improvement in pain. PT note reviewed.     As a result of the pain, she finds herself to be down and often sad.  Pain limits her daily activities.  Some issues with sleep due to pain.  No SI/HI.  She reports that she took duloxetine in the past for depression.    ROS: See HPI    Past Medical History:  has a past medical history of Anemia, Hypertension, and Mental disorder.    Current Outpatient Medications   Medication Instructions    amLODIPine (NORVASC) 5 mg, Oral, Nightly    celecoxib (CELEBREX) 200 MG capsule Take 1 capsule by mouth once daily    chlorthalidone (HYGROTEN) 25 mg, Oral, Daily    DULoxetine (CYMBALTA) 30 mg, Oral, Daily    famotidine (PEPCID) 20 mg, Oral, 2 times daily    gabapentin (NEURONTIN) 800 mg, Oral, 3 times daily    LORazepam (ATIVAN) 1 MG tablet No dose, route, or frequency recorded.    olmesartan (BENICAR) 40 mg, Oral, Daily    pantoprazole (PROTONIX) 40 mg, Oral, Daily    sucralfate (CARAFATE) 1 gram tablet TAKE 1 TABLET BY MOUTH FOUR TIMES A DAY ONE HOUR BEFORE MEALS    tiZANidine (ZANAFLEX) 4 mg, Oral, Every 8 hours PRN    zolpidem (AMBIEN) 10 mg Tab No dose, route, or frequency recorded.       Patient has No Known Allergies.       Visit Vitals  /78 (BP  "Location: Right arm, Patient Position: Sitting, BP Method: Large (Manual))   Pulse 69   Temp 98.2 °F (36.8 °C) (Temporal)   Ht 5' 2.01" (1.575 m)   Wt 77.6 kg (171 lb 1.2 oz)   SpO2 97%   BMI 31.28 kg/m²         Physical Exam  Vitals reviewed.   Constitutional:       Appearance: Normal appearance.   Cardiovascular:      Rate and Rhythm: Normal rate and regular rhythm.      Heart sounds: Normal heart sounds.   Pulmonary:      Effort: Pulmonary effort is normal.      Breath sounds: Normal breath sounds.   Musculoskeletal:      Cervical back: Normal range of motion and neck supple.      Right hip: Tenderness and bony tenderness present. Decreased range of motion. Decreased strength.   Skin:     General: Skin is warm and dry.   Neurological:      Mental Status: She is alert and oriented to person, place, and time. Mental status is at baseline.   Psychiatric:         Mood and Affect: Mood normal.         Thought Content: Thought content normal.         Judgment: Judgment normal.         Recent Labs Obtained:  No visits with results within 7 Day(s) from this visit.   Latest known visit with results is:   Office Visit on 05/16/2023   Component Date Value Ref Range Status    Sodium Level 05/16/2023 138  135 - 145 mmol/L Final    Potassium Level 05/16/2023 4.7  3.5 - 5.1 mmol/L Final    Chloride 05/16/2023 106  98 - 110 mmol/L Final    Carbon Dioxide 05/16/2023 26  21 - 32 mmol/L Final    Glucose Level 05/16/2023 92  70 - 115 mg/dL Final    Blood Urea Nitrogen 05/16/2023 16.0  7.0 - 20.0 mg/dL Final    Creatinine 05/16/2023 0.69  0.66 - 1.25 mg/dL Final    Calcium Level Total 05/16/2023 9.1  8.4 - 10.2 mg/dL Final    Protein Total 05/16/2023 6.7  6.3 - 8.2 gm/dL Final    Albumin Level 05/16/2023 4.1  3.4 - 5.0 g/dL Final    Globulin 05/16/2023 2.6  2.0 - 3.9 gm/dL Final    Albumin/Globulin Ratio 05/16/2023 1.6  ratio Final    Bilirubin Total 05/16/2023 0.3  0.0 - 1.0 mg/dL Final    Alkaline Phosphatase 05/16/2023 68  50 - " 144 unit/L Final    Alanine Aminotransferase 05/16/2023 19  1 - 45 unit/L Final    Aspartate Aminotransferase 05/16/2023 31  14 - 36 unit/L Final    eGFR 05/16/2023 >90  mls/min/1.73/m2 Final                         EGFR INTERPRETATION    Beginning 8/15/22 we are reporting the eGFRcr calculation as recommended by the [employer]. The eGFRcr equation has similar overall performance characteristics to the older equation, but the values may differ by more than 10% particularly at higher values of eGFRcr and younger adult ages.    NKF stages of chronic kidney disease (CKD)  Stage 1: Kidney damage with normal or increased eGFR (>90 mL/min/1.73 m^2)  Stage 2: Mild reduction in GFR (60-89 mL/min/1.73 m^2)  Stage 3a: Moderate reduction in GFR (45-59 mL/min/1.73 m^2)  Stage 3b: Moderate reduction in GFR (30-44 mL/min/1.73 m^2)  Stage 4: Severe reduction in GFR (15-29 mL/min/1.73 m^2)  Stage 5: Kidney failure (GFR <15 mL/min/1.73 m^2)        Anion Gap 05/16/2023 6.0  2.0 - 13.0 mEq/L Final    BUN/Creatinine Ratio 05/16/2023 23 (H)  12 - 20 Final    Cholesterol Total 05/16/2023 174  0 - 200 mg/dL Final    HDL Cholesterol 05/16/2023 62 (H)  40 - 60 mg/dL Final    Triglyceride 05/16/2023 85  30 - 200 mg/dL Final    LDL Cholesterol Direct 05/16/2023 82.8  30.0 - 100.0 mg/dL Final    Hemoglobin A1c 05/16/2023 5.5  4.0 - 6.0 % Final    Estimated Average Glucose 05/16/2023 111.2  70.0 - 115.0 mg/dL Final    Thyroid Stimulating Hormone 05/16/2023 1.570  0.360 - 3.740 uIU/mL Final    Iron Binding Capacity Unsaturated 05/16/2023 231  70 - 310 ug/dL Final    Iron Level 05/16/2023 84  50 - 170 ug/dL Final    Transferrin 05/16/2023 285  180 - 382 mg/dL Final    Iron Binding Capacity Total 05/16/2023 315  250 - 450 ug/dL Final    Iron Saturation 05/16/2023 27  20 - 50 % Final    Ferritin Level 05/16/2023 8.54  6.24 - 264.00 ng/mL Final    Vit D 25 OH 05/16/2023 <20.0 (L)  30.0 - 100.0 ng/mL Final    WBC 05/16/2023 8.63  4.00  - 11.50 x10(3)/mcL Final    RBC 05/16/2023 4.67  4.00 - 5.10 x10(6)/mcL Final    Hgb 05/16/2023 13.6  11.8 - 16.0 g/dL Final    Hct 05/16/2023 41.4  36.0 - 48.0 % Final    MCV 05/16/2023 88.7  79.0 - 99.0 fL Final    MCH 05/16/2023 29.1  27.0 - 34.0 pg Final    MCHC 05/16/2023 32.9  31.0 - 37.0 g/dL Final    RDW 05/16/2023 15.0 (H)  11.0 - 14.5 % Final    Platelet 05/16/2023 247  140 - 371 x10(3)/mcL Final    MPV 05/16/2023 12.0  9.4 - 12.4 fL Final    Neut % 05/16/2023 68.5  37 - 73 % Final    Lymph % 05/16/2023 23.3  20 - 55 % Final    Mono % 05/16/2023 6.4  4.7 - 12.5 % Final    Eos % 05/16/2023 1.0  0.7 - 7 % Final    Basophil % 05/16/2023 0.6  0.1 - 1.2 % Final    Lymph # 05/16/2023 2.01  1.16 - 3.74 x10(3)/mcL Final    Neut # 05/16/2023 5.91  1.56 - 6.13 x10(3)/mcL Final    Mono # 05/16/2023 0.55 (H)  0.24 - 0.36 x10(3)/mcL Final    Eos # 05/16/2023 0.09  0.04 - 0.36 x10(3)/mcL Final    Baso # 05/16/2023 0.05  0.01 - 0.08 x10(3)/mcL Final    IG# 05/16/2023 0.02  0.0001 - 0.031 x10(3)/mcL Final    IG% 05/16/2023 0.2  0 - 0.5 % Final    NRBC% 05/16/2023 0.0  <=1 % Final         Assessment/Plan:    ICD-10-CM ICD-9-CM   1. Primary hypertension  I10 401.9   2. Right hip pain  M25.551 719.45   3. Polyneuropathy  G62.9 356.9   4. Mild episode of recurrent major depressive disorder  F33.0 296.31       1. Primary hypertension  Well controlled today  Continue amlodipine, chlorthalidone, and olmesartan  - amLODIPine (NORVASC) 5 MG tablet; Take 1 tablet (5 mg total) by mouth every evening.  Dispense: 90 tablet; Refill: 3  - chlorthalidone (HYGROTEN) 25 MG Tab; Take 1 tablet (25 mg total) by mouth once daily.  Dispense: 90 tablet; Refill: 3    2. Right hip pain  Pain persists despite PT and anti-inflammatories  Obtain MRI right hip  - DULoxetine (CYMBALTA) 30 MG capsule; Take 1 capsule (30 mg total) by mouth once daily.  Dispense: 30 capsule; Refill: 0  - MRI Hip Without Contrast Right; Future    3. Polyneuropathy  Begin  duloxetine 30 mg daily    4. Mild episode of recurrent major depressive disorder  Begin 30 mg daily  Discussed benefits of medication not becoming noticeable until up to 6 weeks from start date.   Exercise daily. Get sunlight daily.  Practice positive phrases and repeat throughout the day, along with yoga and relaxation techniques.  Establish good social support, make changes to reduce stress.  Encourage counseling.   Reports any symptoms of suicidal/homicidal ideations or self harm immediately. If clinic is closed, go to nearest emergency room.     5. History gastric ulcer  We discussed risk of perforation/hemorrhage wtih NSAIDs.  Patient understands but NSAIDs the only medication that makes pain tolerable        Follow up in about 4 weeks (around 9/12/2023) for meds. or sooner as needed.    Future Appointments   Date Time Provider Department Center   9/18/2023  1:00 PM Carmen Henry FNP [facility]   2/6/2024 10:30 AM Rafael Freitas MD [id number]  S[facility] Mckenzie

## 2023-08-18 DIAGNOSIS — S39.011A STRAIN OF MUSCLE OF RIGHT GROIN REGION: ICD-10-CM

## 2023-08-21 DIAGNOSIS — M19.90 OSTEOARTHRITIS, UNSPECIFIED OSTEOARTHRITIS TYPE, UNSPECIFIED SITE: ICD-10-CM

## 2023-08-21 RX ORDER — CELECOXIB 200 MG/1
CAPSULE ORAL
Qty: 30 CAPSULE | Refills: 0 | Status: SHIPPED | OUTPATIENT
Start: 2023-08-21 | End: 2023-08-24 | Stop reason: SDUPTHER

## 2023-08-21 RX ORDER — TIZANIDINE 4 MG/1
4 TABLET ORAL EVERY 8 HOURS PRN
Qty: 90 TABLET | Refills: 0 | Status: SHIPPED | OUTPATIENT
Start: 2023-08-21 | End: 2023-08-24 | Stop reason: SDUPTHER

## 2023-08-24 ENCOUNTER — PATIENT MESSAGE (OUTPATIENT)
Dept: FAMILY MEDICINE | Facility: CLINIC | Age: 53
End: 2023-08-24
Payer: MEDICAID

## 2023-08-24 ENCOUNTER — HOSPITAL ENCOUNTER (OUTPATIENT)
Dept: RADIOLOGY | Facility: HOSPITAL | Age: 53
Discharge: HOME | End: 2023-08-24
Attending: NURSE PRACTITIONER
Payer: MEDICAID

## 2023-08-24 DIAGNOSIS — G62.9 POLYNEUROPATHY: ICD-10-CM

## 2023-08-24 DIAGNOSIS — S39.011A STRAIN OF MUSCLE OF RIGHT GROIN REGION: ICD-10-CM

## 2023-08-24 DIAGNOSIS — M19.90 OSTEOARTHRITIS, UNSPECIFIED OSTEOARTHRITIS TYPE, UNSPECIFIED SITE: ICD-10-CM

## 2023-08-24 DIAGNOSIS — M25.551 PAIN OF RIGHT HIP: Primary | ICD-10-CM

## 2023-08-24 DIAGNOSIS — M25.551 RIGHT HIP PAIN: ICD-10-CM

## 2023-08-24 DIAGNOSIS — I10 PRIMARY HYPERTENSION: ICD-10-CM

## 2023-08-24 DIAGNOSIS — K25.7 CHRONIC GASTRIC ULCER WITHOUT HEMORRHAGE AND WITHOUT PERFORATION: ICD-10-CM

## 2023-08-24 PROCEDURE — 73721 MRI JNT OF LWR EXTRE W/O DYE: CPT | Mod: TC,RT

## 2023-08-24 RX ORDER — OLMESARTAN MEDOXOMIL 40 MG/1
40 TABLET ORAL DAILY
Qty: 90 TABLET | Refills: 3 | Status: SHIPPED | OUTPATIENT
Start: 2023-08-24 | End: 2024-08-23

## 2023-08-24 RX ORDER — CHLORTHALIDONE 25 MG/1
25 TABLET ORAL DAILY
Qty: 90 TABLET | Refills: 3 | Status: SHIPPED | OUTPATIENT
Start: 2023-08-24 | End: 2024-08-23

## 2023-08-24 RX ORDER — TIZANIDINE 4 MG/1
4 TABLET ORAL EVERY 8 HOURS PRN
Qty: 90 TABLET | Refills: 3 | Status: SHIPPED | OUTPATIENT
Start: 2023-08-24

## 2023-08-24 RX ORDER — GABAPENTIN 800 MG/1
800 TABLET ORAL 3 TIMES DAILY
Qty: 90 TABLET | Refills: 3 | Status: SHIPPED | OUTPATIENT
Start: 2023-08-24 | End: 2024-01-05

## 2023-08-24 RX ORDER — DULOXETIN HYDROCHLORIDE 30 MG/1
30 CAPSULE, DELAYED RELEASE ORAL DAILY
Qty: 30 CAPSULE | Refills: 3 | Status: ON HOLD | OUTPATIENT
Start: 2023-08-24 | End: 2024-01-08 | Stop reason: CLARIF

## 2023-08-24 RX ORDER — FAMOTIDINE 20 MG/1
20 TABLET, FILM COATED ORAL 2 TIMES DAILY
Qty: 180 TABLET | Refills: 3 | Status: SHIPPED | OUTPATIENT
Start: 2023-08-24 | End: 2024-08-23

## 2023-08-24 RX ORDER — PANTOPRAZOLE SODIUM 40 MG/1
40 TABLET, DELAYED RELEASE ORAL DAILY
Qty: 90 TABLET | Refills: 3 | Status: SHIPPED | OUTPATIENT
Start: 2023-08-24 | End: 2024-08-23

## 2023-08-24 RX ORDER — CELECOXIB 200 MG/1
200 CAPSULE ORAL DAILY
Qty: 30 CAPSULE | Refills: 3 | Status: ON HOLD | OUTPATIENT
Start: 2023-08-24 | End: 2024-01-08 | Stop reason: CLARIF

## 2023-08-24 RX ORDER — AMLODIPINE BESYLATE 5 MG/1
5 TABLET ORAL NIGHTLY
Qty: 90 TABLET | Refills: 3 | Status: SHIPPED | OUTPATIENT
Start: 2023-08-24 | End: 2024-08-23

## 2023-08-30 ENCOUNTER — PATIENT MESSAGE (OUTPATIENT)
Dept: FAMILY MEDICINE | Facility: CLINIC | Age: 53
End: 2023-08-30
Payer: MEDICAID

## 2023-08-30 DIAGNOSIS — M25.551 RIGHT HIP PAIN: Primary | ICD-10-CM

## 2023-09-20 ENCOUNTER — PATIENT MESSAGE (OUTPATIENT)
Dept: ADMINISTRATIVE | Facility: HOSPITAL | Age: 53
End: 2023-09-20
Payer: MEDICAID

## 2023-10-01 PROBLEM — Z98.84 S/P GASTRIC BYPASS: Status: ACTIVE | Noted: 2023-10-01

## 2023-10-09 ENCOUNTER — PATIENT MESSAGE (OUTPATIENT)
Dept: ADMINISTRATIVE | Facility: OTHER | Age: 53
End: 2023-10-09
Payer: MEDICAID

## 2024-01-08 PROBLEM — M84.353A FEMORAL NECK STRESS FRACTURE, INITIAL ENCOUNTER: Status: ACTIVE | Noted: 2024-01-08

## 2024-01-25 ENCOUNTER — NURSE TRIAGE (OUTPATIENT)
Dept: ADMINISTRATIVE | Facility: CLINIC | Age: 54
End: 2024-01-25
Payer: MEDICAID

## 2024-01-25 NOTE — TELEPHONE ENCOUNTER
Reason for Disposition   Systolic BP < 90 and feeling dizzy, lightheaded, or weak    Protocols used: Low Blood Pressure-A-OH  Pt states she had surgery (fractured femur) on 1/8/24. States at her post op appointment on 1/23/24 to remove stiches her BP was extremely low. States she was asked if her BP is normally low and she told the Provider her blood pressure was never that low before.     Pt states today her BP is 75/42 and heart rate is 48. States she's in bed, but feels lightheaded. Pt advised to elevate her legs and to call 911 now for EMS. Pt verbalized understanding.

## 2024-01-25 NOTE — TELEPHONE ENCOUNTER
Pt states she has moved to [place] about 6 months ago and she is going see her current PCP. Her  is on his way to get her

## 2024-01-29 ENCOUNTER — PATIENT OUTREACH (OUTPATIENT)
Dept: ADMINISTRATIVE | Facility: HOSPITAL | Age: 54
End: 2024-01-29
Payer: MEDICAID

## 2024-01-29 ENCOUNTER — PATIENT MESSAGE (OUTPATIENT)
Dept: ADMINISTRATIVE | Facility: HOSPITAL | Age: 54
End: 2024-01-29
Payer: MEDICAID

## 2024-03-14 ENCOUNTER — PATIENT MESSAGE (OUTPATIENT)
Dept: FAMILY MEDICINE | Facility: CLINIC | Age: 54
End: 2024-03-14
Payer: MEDICAID